# Patient Record
Sex: MALE | Race: WHITE | Employment: FULL TIME | URBAN - METROPOLITAN AREA
[De-identification: names, ages, dates, MRNs, and addresses within clinical notes are randomized per-mention and may not be internally consistent; named-entity substitution may affect disease eponyms.]

---

## 2017-03-05 ENCOUNTER — APPOINTMENT (OUTPATIENT)
Dept: GENERAL RADIOLOGY | Age: 27
End: 2017-03-05
Attending: EMERGENCY MEDICINE
Payer: SELF-PAY

## 2017-03-05 ENCOUNTER — HOSPITAL ENCOUNTER (EMERGENCY)
Age: 27
Discharge: HOME | End: 2017-03-05
Attending: EMERGENCY MEDICINE
Payer: SELF-PAY

## 2017-03-05 VITALS
SYSTOLIC BLOOD PRESSURE: 140 MMHG | BODY MASS INDEX: 21 KG/M2 | RESPIRATION RATE: 15 BRPM | TEMPERATURE: 98.3 F | HEIGHT: 71 IN | DIASTOLIC BLOOD PRESSURE: 61 MMHG | OXYGEN SATURATION: 100 % | HEART RATE: 103 BPM | WEIGHT: 150 LBS

## 2017-03-05 DIAGNOSIS — T40.601A NARCOTIC OVERDOSE, ACCIDENTAL OR UNINTENTIONAL, INITIAL ENCOUNTER (HCC): Primary | ICD-10-CM

## 2017-03-05 LAB
ALBUMIN SERPL BCP-MCNC: 3.9 G/DL (ref 3.5–5)
ALBUMIN/GLOB SERPL: 1.1 {RATIO} (ref 1.1–2.2)
ALP SERPL-CCNC: 58 U/L (ref 45–117)
ALT SERPL-CCNC: 29 U/L (ref 12–78)
ANION GAP BLD CALC-SCNC: 9 MMOL/L (ref 5–15)
APAP SERPL-MCNC: <2 UG/ML (ref 10–30)
AST SERPL W P-5'-P-CCNC: 17 U/L (ref 15–37)
BASOPHILS # BLD AUTO: 0 K/UL (ref 0–0.1)
BASOPHILS # BLD: 0 % (ref 0–1)
BILIRUB SERPL-MCNC: 0.4 MG/DL (ref 0.2–1)
BUN SERPL-MCNC: 8 MG/DL (ref 6–20)
BUN/CREAT SERPL: 7 (ref 12–20)
CALCIUM SERPL-MCNC: 8.4 MG/DL (ref 8.5–10.1)
CHLORIDE SERPL-SCNC: 102 MMOL/L (ref 97–108)
CO2 SERPL-SCNC: 25 MMOL/L (ref 21–32)
CREAT SERPL-MCNC: 1.09 MG/DL (ref 0.7–1.3)
EOSINOPHIL # BLD: 0.2 K/UL (ref 0–0.4)
EOSINOPHIL NFR BLD: 5 % (ref 0–7)
ERYTHROCYTE [DISTWIDTH] IN BLOOD BY AUTOMATED COUNT: 12.2 % (ref 11.5–14.5)
ETHANOL SERPL-MCNC: <10 MG/DL
GLOBULIN SER CALC-MCNC: 3.5 G/DL (ref 2–4)
GLUCOSE SERPL-MCNC: 201 MG/DL (ref 65–100)
HCT VFR BLD AUTO: 39 % (ref 36.6–50.3)
HGB BLD-MCNC: 13.2 G/DL (ref 12.1–17)
LYMPHOCYTES # BLD AUTO: 35 % (ref 12–49)
LYMPHOCYTES # BLD: 1.4 K/UL (ref 0.8–3.5)
MCH RBC QN AUTO: 30 PG (ref 26–34)
MCHC RBC AUTO-ENTMCNC: 33.8 G/DL (ref 30–36.5)
MCV RBC AUTO: 88.6 FL (ref 80–99)
MONOCYTES # BLD: 0.3 K/UL (ref 0–1)
MONOCYTES NFR BLD AUTO: 9 % (ref 5–13)
NEUTS SEG # BLD: 2 K/UL (ref 1.8–8)
NEUTS SEG NFR BLD AUTO: 51 % (ref 32–75)
PLATELET # BLD AUTO: 129 K/UL (ref 150–400)
POTASSIUM SERPL-SCNC: 3.8 MMOL/L (ref 3.5–5.1)
PROT SERPL-MCNC: 7.4 G/DL (ref 6.4–8.2)
RBC # BLD AUTO: 4.4 M/UL (ref 4.1–5.7)
SALICYLATES SERPL-MCNC: <1.7 MG/DL (ref 2.8–20)
SODIUM SERPL-SCNC: 136 MMOL/L (ref 136–145)
WBC # BLD AUTO: 3.9 K/UL (ref 4.1–11.1)

## 2017-03-05 PROCEDURE — 99285 EMERGENCY DEPT VISIT HI MDM: CPT

## 2017-03-05 PROCEDURE — 36415 COLL VENOUS BLD VENIPUNCTURE: CPT | Performed by: EMERGENCY MEDICINE

## 2017-03-05 PROCEDURE — 71010 XR CHEST PORT: CPT

## 2017-03-05 PROCEDURE — 80307 DRUG TEST PRSMV CHEM ANLYZR: CPT | Performed by: EMERGENCY MEDICINE

## 2017-03-05 PROCEDURE — 85025 COMPLETE CBC W/AUTO DIFF WBC: CPT | Performed by: EMERGENCY MEDICINE

## 2017-03-05 PROCEDURE — 80053 COMPREHEN METABOLIC PANEL: CPT | Performed by: EMERGENCY MEDICINE

## 2017-03-05 PROCEDURE — 93005 ELECTROCARDIOGRAM TRACING: CPT

## 2017-03-05 NOTE — DISCHARGE INSTRUCTIONS
Alcohol, Drug, or Poison Ingestion: Care Instructions  Your Care Instructions  A person can become very sick, or die, from swallowing or using alcohol, drugs, or poisons. Alcohol poisoning occurs when a person drinks a large amount of alcohol. Alcohol can stop nerve signals that control breathing. It can also stop the gag reflex that prevents choking. Alcohol poisoning is serious. It can lead to brain damage or death if it's not treated right away. Drugs can be used by accident or on purpose. They can be swallowed, inhaled, injected, or absorbed through the skin. Drugs include over-the-counter medicine (such as aspirin or acetaminophen) and prescription medicine. They also include vitamins and supplements. And they include illegal drugs such as cocaine and heroin. And poisons are all around us. They include household , cosmetics, houseplants, and garden chemicals. The doctor has checked you carefully, but problems can develop later. If you notice any problems or new symptoms, get medical treatment right away. Follow-up care is a key part of your treatment and safety. Be sure to make and go to all appointments, and call your doctor if you are having problems. It's also a good idea to know your test results and keep a list of the medicines you take. How can you care for yourself at home? Alcohol problems  · Talk to your doctor or counselor about programs that can help you stop using alcohol. · Plan ways to avoid being tempted to drink. ¨ Get rid of all alcohol in your home. ¨ Avoid places where you tend to drink. ¨ Stay away from places or events that offer alcohol. ¨ Stay away from people who drink a lot. Drug problems  · Talk to your doctor about programs that can help you stop using drugs. · Get rid of any drugs you might be tempted to misuse. · Learn how to say no when other people use drugs. · Don't spend time with people who use drugs.   Poison prevention  · Keep products in the containers they came in. Keep them with the original labels. · Be careful when you use cleaning products, paints, solvents, and pesticides. Read labels before use. Use a fan to move strong odors and fumes out of your home. · Do not mix cleaning products. Try to use nontoxic . These include vinegar, lemon juice, and baking soda. When should you call for help? Poison control centers, hospitals, or your doctor can give immediate advice in the case of a poisoning. The [facility] [employer] number is [phone]. Have the poison container with you so you can give complete information to the poison control center, such as what the poison or substance is, how much was taken and when. Do not try to make the person vomit. Call 911 anytime you think you may need emergency care. For example, call if you or someone else:  · Has used or currently uses alcohol or drugs and is very confused or can't stay awake. · Has passed out (lost consciousness). · Has severe trouble breathing. · Is having a seizure. Call your doctor now or seek immediate medical care if you or someone else:  · Has new symptoms, or is not acting normally. Watch closely for changes in your health, and be sure to contact your doctor if:  · You do not get better as expected. · You need help with drug or alcohol problems. · You have problems with depression or other mental health issues. Where can you learn more? Go to http://jatinder-fide.info/. Enter Z059 in the search box to learn more about \"Alcohol, Drug, or Poison Ingestion: Care Instructions. \"  Current as of: May 27, 2016  Content Version: 11.1  © 9976-2749 [employer]. Care instructions adapted under license by [employer] (which disclaims liability or warranty for this information).  If you have questions about a medical condition or this instruction, always ask your healthcare professional. [employer] disclaims any warranty or liability for your use of this information. We hope that we have addressed all of your medical concerns. The examination and treatment you received in the Emergency Department were for an emergent problem and were not intended as complete care. It is important that you follow up with your healthcare provider(s) for ongoing care. If your symptoms worsen or do not improve as expected, and you are unable to reach your usual health care provider(s), you should return to the Emergency Department. Today's healthcare is undergoing tremendous change, and patient satisfaction surveys are one of the many tools to assess the quality of medical care. You may receive a survey from the [employer] organization regarding your experience in the Emergency Department. I hope that your experience has been completely positive, particularly the medical care that I provided. As such, please participate in the survey; anything less than excellent does not meet my expectations or intentions. Formerly [facility] and [address] [place] [address] participate in nationally recognized quality of care measures. If your blood pressure is greater than 120/80, as reported below, we urge that you seek medical care to address the potential of high blood pressure, commonly known as hypertension. Hypertension can be hereditary or can be caused by certain medical conditions, pain, stress, or \"white coat syndrome. \"       Please make an appointment with your health care provider(s) for follow up of your Emergency Department visit. VITALS:   Patient Vitals for the past 8 hrs:   Temp Pulse Resp BP SpO2   03/05/17 1415 - 95 20 155/83 100 %   03/05/17 1400 - 98 15 (!) 149/98 100 %   03/05/17 1345 - 96 20 143/88 100 %   03/05/17 1336 98.3 °F (36.8 °C) 85 18 (!) 161/103 100 %          Thank you for allowing us to provide you with medical care today.   We realize that you have many choices for your emergency care needs. Please choose us in the future for any continued health care needs. Carla Devaughn Nellie Finch, [address] [place], [place] [address]y [address].   Office: [phone]            Recent Results (from the past 24 hour(s))   EKG, 12 LEAD, INITIAL    Collection Time: 03/05/17  1:35 PM   Result Value Ref Range    Ventricular Rate 88 BPM    Atrial Rate 88 BPM    P-R Interval 136 ms    QRS Duration 94 ms    Q-T Interval 374 ms    QTC Calculation (Bezet) 452 ms    Calculated P Axis 80 degrees    Calculated R Axis 92 degrees    Calculated T Axis 33 degrees    Diagnosis       Normal sinus rhythm with sinus arrhythmia  When compared with ECG of 20-NOV-2014 18:26,  Vent. rate has decreased BY  57 BPM     METABOLIC PANEL, COMPREHENSIVE    Collection Time: 03/05/17  1:41 PM   Result Value Ref Range    Sodium 136 136 - 145 mmol/L    Potassium 3.8 3.5 - 5.1 mmol/L    Chloride 102 97 - 108 mmol/L    CO2 25 21 - 32 mmol/L    Anion gap 9 5 - 15 mmol/L    Glucose 201 (H) 65 - 100 mg/dL    BUN 8 6 - 20 MG/DL    Creatinine 1.09 0.70 - 1.30 MG/DL    BUN/Creatinine ratio 7 (L) 12 - 20      GFR est AA >60 >60 ml/min/1.73m2    GFR est non-AA >60 >60 ml/min/1.73m2    Calcium 8.4 (L) 8.5 - 10.1 MG/DL    Bilirubin, total 0.4 0.2 - 1.0 MG/DL    ALT (SGPT) 29 12 - 78 U/L    AST (SGOT) 17 15 - 37 U/L    Alk. phosphatase 58 45 - 117 U/L    Protein, total 7.4 6.4 - 8.2 g/dL    Albumin 3.9 3.5 - 5.0 g/dL    Globulin 3.5 2.0 - 4.0 g/dL    A-G Ratio 1.1 1.1 - 2.2     CBC WITH AUTOMATED DIFF    Collection Time: 03/05/17  1:41 PM   Result Value Ref Range    WBC 3.9 (L) 4.1 - 11.1 K/uL    RBC 4.40 4. 10 - 5.70 M/uL    HGB 13.2 12.1 - 17.0 g/dL    HCT 39.0 36.6 - 50.3 %    MCV 88.6 80.0 - 99.0 FL    MCH 30.0 26.0 - 34.0 PG    MCHC 33.8 30.0 - 36.5 g/dL    RDW 12.2 11.5 - 14.5 %    PLATELET 685 (L) 063 - 400 K/uL    NEUTROPHILS 51 32 - 75 %    LYMPHOCYTES 35 12 - 49 %    MONOCYTES 9 5 - 13 %    EOSINOPHILS 5 0 - 7 %    BASOPHILS 0 0 - 1 %    ABS. NEUTROPHILS 2.0 1.8 - 8.0 K/UL    ABS. LYMPHOCYTES 1.4 0.8 - 3.5 K/UL    ABS. MONOCYTES 0.3 0.0 - 1.0 K/UL    ABS. EOSINOPHILS 0.2 0.0 - 0.4 K/UL    ABS. BASOPHILS 0.0 0.0 - 0.1 K/UL   ACETAMINOPHEN    Collection Time: 03/05/17  1:41 PM   Result Value Ref Range    ACETAMINOPHEN <2 (L) 10 - 30 ug/mL   ETHYL ALCOHOL    Collection Time: 03/05/17  1:41 PM   Result Value Ref Range    ALCOHOL(ETHYL),SERUM <51 <76 MG/DL   SALICYLATE    Collection Time: 03/05/17  1:41 PM   Result Value Ref Range    SALICYLATE <1.1 (L) 2.8 - 20.0 MG/DL       Xr Chest Port    Result Date: 3/5/2017  Clinical history: Overdose INDICATION: Overdose COMPARISON: 2014 FINDINGS: AP semiupright view of the chest is obtained . The cardiopericardial silhouette is stable. There is no pleural effusion, pneumothorax or focal consolidation present. IMPRESSION: No acute thoracic disease.

## 2017-03-05 NOTE — ED TRIAGE NOTES
Triage:  Pt to ED via EMS due to overdose/unresponsiveness. Pt states this afternoon he used heroin of unknown amount, EMS called due to Pt being found unresponsive with little to no respiratory effort. EMS gave 1.5 mg of Narcan and Pt had near immediate improvement in status, orientation, and respiratory effort. EMS states stable transport enroute, no other events noted. On arrival, Pt A/O x4, with continued verbal requests to talk with his sister. Pt states he feels fine.

## 2017-03-05 NOTE — ED PROVIDER NOTES
HPI Comments: 23 y.o. male with past medical history significant for heroin overdose, bipolar, depression and anxiety who presents from home via EMS for evaluation of overdose. Per EMS, pt was found unresponsive, not breathing but still had a pulse. Pt was given 1.5 mg of Narcan IV and had an immediate improvement in his condition. Pt admits to shooting 1 point of heroin today but says that \"this is the first time he has used in a while\". Pt has no complaints at this time. Pt denies other health problems. Pt denies weight loss or changes in appetite. Pt is a poor historian. There are no other acute medical concerns at this time. Social hx: (+) Heroin use, denies EtOH. Claims to have new career. Note written by Mini Perez, as dictated by Mirian Perdomo MD 1:37 PM      The history is provided by the patient and the EMS personnel. No  was used. Past Medical History:   Diagnosis Date    Acid reflux     Allergic rhinitis     Anxiety     Bipolar     Depression     Drug overdose     Heroin    Psychotic disorder        Past Surgical History:   Procedure Laterality Date    HX ORTHOPAEDIC      Right elbow    HX ORTHOPAEDIC      L hand         Family History:   Problem Relation Age of Onset    Heart Disease Mother     Diabetes Maternal Uncle        Social History     Social History    Marital status: SINGLE     Spouse name: N/A    Number of children: N/A    Years of education: N/A     Occupational History    Not on file.      Social History Main Topics    Smoking status: Current Every Day Smoker     Packs/day: 1.00    Smokeless tobacco: Former User    Alcohol use No    Drug use: Yes     Special: Marijuana, Heroin    Sexual activity: Yes     Partners: Female     Birth control/ protection: None     Other Topics Concern    Not on file     Social History Narrative         ALLERGIES: Sulfa (sulfonamide antibiotics)    Review of Systems   Constitutional: Negative for appetite change, chills, fever and unexpected weight change. HENT: Negative for congestion. Eyes: Negative for visual disturbance. Respiratory: Negative for cough, chest tightness, shortness of breath and wheezing. Cardiovascular: Negative for chest pain. Gastrointestinal: Negative for abdominal pain, diarrhea and vomiting. Genitourinary: Negative for dysuria and frequency. Musculoskeletal: Negative for joint swelling. Skin: Negative for rash. Neurological: Negative for speech difficulty and headaches. All other systems reviewed and are negative. Vitals:    03/05/17 1336   BP: (!) 161/103   Pulse: 85   Resp: 18   Temp: 98.3 °F (36.8 °C)   SpO2: 100%   Weight: 68 kg (150 lb)   Height: 5' 11\" (1.803 m)            Physical Exam   Constitutional: He is oriented to person, place, and time. He appears well-developed and well-nourished. No distress. HENT:   Head: Normocephalic and atraumatic. Nose: Nose normal.   Eyes: Conjunctivae are normal. Pupils are equal, round, and reactive to light. No scleral icterus. Neck: Normal range of motion. Neck supple. No JVD present. No tracheal deviation present. No thyromegaly present. Cardiovascular: Normal rate, regular rhythm and normal heart sounds. No murmur heard. Pulmonary/Chest: Effort normal and breath sounds normal. No respiratory distress. He has no wheezes. He has no rales. Abdominal: Soft. Bowel sounds are normal. He exhibits no mass. There is no tenderness. There is no rebound and no guarding. Musculoskeletal: Normal range of motion. He exhibits no edema. Neurological: He is alert and oriented to person, place, and time. No cranial nerve deficit. Coordination normal.   Skin: Skin is warm and dry. No rash noted. He is not diaphoretic. No erythema. Psychiatric: He has a normal mood and affect. His behavior is normal.   Nursing note and vitals reviewed. Note written by Rock Caveshaista So.  Jaylen Riley, as dictated by Basil Anderson Payton Dandy, MD 1:37 PM      [facility]  ED Course       Procedures

## 2017-03-06 LAB
ATRIAL RATE: 88 BPM
CALCULATED P AXIS, ECG09: 80 DEGREES
CALCULATED R AXIS, ECG10: 92 DEGREES
CALCULATED T AXIS, ECG11: 33 DEGREES
DIAGNOSIS, 93000: NORMAL
P-R INTERVAL, ECG05: 136 MS
Q-T INTERVAL, ECG07: 374 MS
QRS DURATION, ECG06: 94 MS
QTC CALCULATION (BEZET), ECG08: 452 MS
VENTRICULAR RATE, ECG03: 88 BPM

## 2017-05-11 ENCOUNTER — HOSPITAL ENCOUNTER (EMERGENCY)
Age: 27
Discharge: HOME | End: 2017-05-11
Attending: EMERGENCY MEDICINE
Payer: SELF-PAY

## 2017-05-11 VITALS
RESPIRATION RATE: 16 BRPM | DIASTOLIC BLOOD PRESSURE: 65 MMHG | SYSTOLIC BLOOD PRESSURE: 121 MMHG | OXYGEN SATURATION: 100 % | BODY MASS INDEX: 20.3 KG/M2 | HEART RATE: 74 BPM | HEIGHT: 71 IN | TEMPERATURE: 98.1 F | WEIGHT: 145 LBS

## 2017-05-11 DIAGNOSIS — L03.114 CELLULITIS OF FOREARM, LEFT: Primary | ICD-10-CM

## 2017-05-11 PROCEDURE — 99282 EMERGENCY DEPT VISIT SF MDM: CPT

## 2017-05-11 RX ORDER — CLINDAMYCIN HYDROCHLORIDE 150 MG/1
300 CAPSULE ORAL 3 TIMES DAILY
Qty: 42 CAP | Refills: 0 | Status: SHIPPED | OUTPATIENT
Start: 2017-05-11 | End: 2017-05-18

## 2017-05-12 NOTE — ED PROVIDER NOTES
HPI Comments: Kelly Oneill III is a 23 y.o. male who presents home to ER with c/o left arm pain. Pt reports 4-5 day hx of left forearm pain, erythema and swelling. Pt states symptoms have worsened since onset. He describes site as initally similar to an insect bite of no known origin. Pt is concerned for possible infection. No exacerbating or alleviating factors noted. Pt denies erythema or swelling past elbow of LUE. He further denies any fevers, chills, nausea, vomiting, chest pain, shortness of breath, headache, rash, diarrhea, abdominal pain, urinary/bowel changes, sweating or weight loss. There are no other acute medical concerns at this time. PCP: None   PMHx significant for: Past Medical History:  No date: Acid reflux  No date: Allergic rhinitis  No date: Anxiety  No date: Bipolar  No date: Depression  No date: Drug overdose      Comment: Heroin  No date: Psychotic disorder    PSHx significant for: Past Surgical History:  No date: HX ORTHOPAEDIC      Comment: Right elbow  No date: HX ORTHOPAEDIC      Comment: L hand     Allergies:    -- Sulfa (Sulfonamide Antibiotics) -- Unknown (comments)    --  States has always been told allergic but does not             recall any specific reaction    Note written by Jaylen Feliciano, as dictated by Mary Florentino PA-C 2:19 AM    The history is provided by the patient. No  was used.         Past Medical History:   Diagnosis Date    Acid reflux     Allergic rhinitis     Anxiety     Bipolar     Depression     Drug overdose     Heroin    Psychotic disorder        Past Surgical History:   Procedure Laterality Date    HX ORTHOPAEDIC      Right elbow    HX ORTHOPAEDIC      L hand         Family History:   Problem Relation Age of Onset    Heart Disease Mother     Diabetes Maternal Uncle        Social History     Social History    Marital status: SINGLE     Spouse name: N/A    Number of children: N/A    Years of education: N/A Occupational History    Not on file. Social History Main Topics    Smoking status: Current Every Day Smoker     Packs/day: 1.00    Smokeless tobacco: Former User    Alcohol use No    Drug use: Yes     Special: Marijuana, Heroin    Sexual activity: Yes     Partners: Female     Birth control/ protection: None     Other Topics Concern    Not on file     Social History Narrative         ALLERGIES: Sulfa (sulfonamide antibiotics)    Review of Systems   Constitutional: Negative. Negative for chills and fever. HENT: Negative. Eyes: Negative. Respiratory: Negative. Cardiovascular: Negative. Gastrointestinal: Negative. Genitourinary: Negative. Musculoskeletal: Negative. Skin: Positive for color change (redness left arm). Neurological: Negative. Hematological: Negative. Psychiatric/Behavioral: Negative. All other systems reviewed and are negative. Vitals:    05/11/17 2049   BP: 121/65   Pulse: 74   Resp: 16   Temp: 98.1 °F (36.7 °C)   SpO2: 100%   Weight: 65.8 kg (145 lb)   Height: 5' 11\" (1.803 m)            Physical Exam   Constitutional: He is oriented to person, place, and time. He appears well-developed and well-nourished. No distress. HENT:   Head: Normocephalic and atraumatic. Neck: Normal range of motion. Cardiovascular: Intact distal pulses and normal pulses. Musculoskeletal: Normal range of motion. He exhibits no edema or tenderness. Neurological: He is alert and oriented to person, place, and time. He has normal strength. No sensory deficit. Skin: Skin is warm and dry. No rash noted. He is not diaphoretic. No erythema. No pallor. Psychiatric: He has a normal mood and affect. His behavior is normal.   Nursing note and vitals reviewed.        MDM  Number of Diagnoses or Management Options  Cellulitis of forearm, left:   Diagnosis management comments: DDx: cellulitis, abscess, insect bite       Amount and/or Complexity of Data Reviewed  Discuss the patient with other providers: yes    Patient Progress  Patient progress: stable    ED Course       Procedures                       9:45 PM   Asa Morton PA-C discussed patient with Arnol Warner MD who is in agreement with care plan as outlined. No further recommendations. Asa Morton PA-C          9:45 PM  Pt has been reevaluated. There are no new complaints, changes, or physical findings at this time. Medications have been reviewed w/ pt and/or family. Pt and/or family's questions have been answered. Pt and/or family expressed good understanding of the dx/tx/rx and is in agreement with plan of care. Pt instructed and agreed to f/u w/ PCP and to return to ED upon further deterioration. Pt is ready for discharge. LABORATORY TESTS:  No results found for this or any previous visit (from the past 12 hour(s)). IMAGING RESULTS:  No orders to display     No results found. MEDICATIONS GIVEN:  Medications - No data to display    IMPRESSION:  1. Cellulitis of forearm, left        PLAN:  1. Current Discharge Medication List      START taking these medications    Details   clindamycin (CLEOCIN) 150 mg capsule Take 2 Caps by mouth three (3) times daily for 7 days. Qty: 42 Cap, Refills: 0           2.    Follow-up Information     Follow up With Details Comments [address] [facility] Schedule an appointment as soon as possible for a visit in 3 days  [address] [address] [address]    [facility] EMERGENCY DEPT  If symptoms worsen [address] [facility]  [phone]            Return to ED if worse

## 2017-05-12 NOTE — DISCHARGE INSTRUCTIONS
We hope that we have addressed all of your medical concerns. The examination and treatment you received in the Emergency Department were for an emergent problem and were not intended as complete care. It is important that you follow up with your healthcare provider(s) for ongoing care. If your symptoms worsen or do not improve as expected, and you are unable to reach your usual health care provider(s), you should return to the Emergency Department. Today's healthcare is undergoing tremendous change, and patient satisfaction surveys are one of the many tools to assess the quality of medical care. You may receive a survey from the [employer] regarding your experience in the Emergency Department. I hope that your experience has been completely positive, particularly the medical care that I provided. As such, please participate in the survey; anything less than excellent does not meet my expectations or intentions. [facility] and [address] [place] [address] participate in nationally recognized quality of care measures. If your blood pressure is greater than 120/80, as reported below, we urge that you seek medical care to address the potential of high blood pressure, commonly known as hypertension. Hypertension can be hereditary or can be caused by certain medical conditions, pain, stress, or \"white coat syndrome. \"       Please make an appointment with your health care provider(s) for follow up of your Emergency Department visit. VITALS:   Patient Vitals for the past 8 hrs:   Temp Pulse Resp BP SpO2   05/11/17 2049 98.1 °F (36.7 °C) 74 16 121/65 100 %          Thank you for allowing us to provide you with medical care today. We realize that you have many choices for your emergency care needs. Please choose us in the future for any continued health care needs. Gurinder Grijalva, [address] [place] [address] [address] Office: [phone]            No results found for this or any previous visit (from the past 24 hour(s)). No results found. Cellulitis: Care Instructions  Your Care Instructions    Cellulitis is a skin infection. It often occurs after a break in the skin from a scrape, cut, bite, or puncture, or after a rash. The doctor has checked you carefully, but problems can develop later. If you notice any problems or new symptoms, get medical treatment right away. Follow-up care is a key part of your treatment and safety. Be sure to make and go to all appointments, and call your doctor if you are having problems. It's also a good idea to know your test results and keep a list of the medicines you take. How can you care for yourself at home? · Take your antibiotics as directed. Do not stop taking them just because you feel better. You need to take the full course of antibiotics. · Prop up the infected area on pillows to reduce pain and swelling. Try to keep the area above the level of your heart as often as you can. · If your doctor told you how to care for your wound, follow your doctor's instructions. If you did not get instructions, follow this general advice:  ¨ Wash the wound with clean water 2 times a day. Don't use hydrogen peroxide or alcohol, which can slow healing. ¨ You may cover the wound with a thin layer of petroleum jelly, such as Vaseline, and a nonstick bandage. ¨ Apply more petroleum jelly and replace the bandage as needed. · Be safe with medicines. Take pain medicines exactly as directed. ¨ If the doctor gave you a prescription medicine for pain, take it as prescribed. ¨ If you are not taking a prescription pain medicine, ask your doctor if you can take an over-the-counter medicine. To prevent cellulitis in the future  · Try to prevent cuts, scrapes, or other injuries to your skin. Cellulitis most often occurs where there is a break in the skin.   · If you get a scrape, cut, mild burn, or bite, wash the wound with clean water as soon as you can to help avoid infection. Don't use hydrogen peroxide or alcohol, which can slow healing. · If you have swelling in your legs (edema), support stockings and good skin care may help prevent leg sores and cellulitis. · Take care of your feet, especially if you have diabetes or other conditions that increase the risk of infection. Wear shoes and socks. Do not go barefoot. If you have athlete's foot or other skin problems on your feet, talk to your doctor about how to treat them. When should you call for help? Call your doctor now or seek immediate medical care if:  · You have signs that your infection is getting worse, such as:  ¨ Increased pain, swelling, warmth, or redness. ¨ Red streaks leading from the area. ¨ Pus draining from the area. ¨ A fever. · You get a rash. Watch closely for changes in your health, and be sure to contact your doctor if:  · You are not getting better after 1 day (24 hours). · You do not get better as expected. Where can you learn more? Go to http://jatinder-fide.info/. Angeles Kaminski in the search box to learn more about \"Cellulitis: Care Instructions. \"  Current as of: October 13, 2016  Content Version: 11.2  © 5239-3688 [employer]. Care instructions adapted under license by [employer] (which disclaims liability or warranty for this information). If you have questions about a medical condition or this instruction, always ask your healthcare professional. [place] [address] any warranty or liability for your use of this information.

## 2017-05-12 NOTE — ED TRIAGE NOTES
Pt arrives with c/o of arm swelling for the  4-5 days, arm is swollen hot to touch, with a palpable knot. Thinks it is an infection.

## 2017-10-10 ENCOUNTER — HOSPITAL ENCOUNTER (EMERGENCY)
Age: 27
Discharge: HOME | End: 2017-10-11
Attending: EMERGENCY MEDICINE
Payer: SELF-PAY

## 2017-10-10 ENCOUNTER — APPOINTMENT (OUTPATIENT)
Dept: CT IMAGING | Age: 27
End: 2017-10-10
Attending: EMERGENCY MEDICINE
Payer: SELF-PAY

## 2017-10-10 ENCOUNTER — APPOINTMENT (OUTPATIENT)
Dept: ULTRASOUND IMAGING | Age: 27
End: 2017-10-10
Attending: EMERGENCY MEDICINE
Payer: SELF-PAY

## 2017-10-10 DIAGNOSIS — N20.0 KIDNEY STONE: Primary | ICD-10-CM

## 2017-10-10 LAB
ALBUMIN SERPL-MCNC: 3.9 G/DL (ref 3.5–5)
ALBUMIN/GLOB SERPL: 1 {RATIO} (ref 1.1–2.2)
ALP SERPL-CCNC: 76 U/L (ref 45–117)
ALT SERPL-CCNC: 365 U/L (ref 12–78)
ANION GAP SERPL CALC-SCNC: 7 MMOL/L (ref 5–15)
APPEARANCE UR: ABNORMAL
AST SERPL-CCNC: 148 U/L (ref 15–37)
BACTERIA URNS QL MICRO: ABNORMAL /HPF
BASOPHILS # BLD: 0 K/UL (ref 0–0.1)
BASOPHILS NFR BLD: 0 % (ref 0–1)
BILIRUB SERPL-MCNC: 0.3 MG/DL (ref 0.2–1)
BILIRUB UR QL: NEGATIVE
BUN SERPL-MCNC: 12 MG/DL (ref 6–20)
BUN/CREAT SERPL: 10 (ref 12–20)
CALCIUM SERPL-MCNC: 9.4 MG/DL (ref 8.5–10.1)
CHLORIDE SERPL-SCNC: 104 MMOL/L (ref 97–108)
CO2 SERPL-SCNC: 30 MMOL/L (ref 21–32)
COLOR UR: ABNORMAL
CREAT SERPL-MCNC: 1.22 MG/DL (ref 0.7–1.3)
DIFFERENTIAL METHOD BLD: ABNORMAL
EOSINOPHIL # BLD: 0.6 K/UL (ref 0–0.4)
EOSINOPHIL NFR BLD: 7 % (ref 0–7)
EPITH CASTS URNS QL MICRO: ABNORMAL /LPF
ERYTHROCYTE [DISTWIDTH] IN BLOOD BY AUTOMATED COUNT: 12.1 % (ref 11.5–14.5)
GLOBULIN SER CALC-MCNC: 4 G/DL (ref 2–4)
GLUCOSE SERPL-MCNC: 102 MG/DL (ref 65–100)
GLUCOSE UR STRIP.AUTO-MCNC: NEGATIVE MG/DL
HCT VFR BLD AUTO: 41.4 % (ref 36.6–50.3)
HGB BLD-MCNC: 14.5 G/DL (ref 12.1–17)
HGB UR QL STRIP: ABNORMAL
KETONES UR QL STRIP.AUTO: NEGATIVE MG/DL
LEUKOCYTE ESTERASE UR QL STRIP.AUTO: NEGATIVE
LYMPHOCYTES # BLD: 2.4 K/UL
LYMPHOCYTES NFR BLD: 31 % (ref 12–49)
MCH RBC QN AUTO: 31.4 PG (ref 26–34)
MCHC RBC AUTO-ENTMCNC: 35 G/DL (ref 30–36.5)
MCV RBC AUTO: 89.6 FL (ref 80–99)
MONOCYTES # BLD: 0.5 K/UL (ref 0–1)
MONOCYTES NFR BLD: 7 % (ref 5–13)
NEUTS SEG # BLD: 4.2 K/UL (ref 1.8–8)
NEUTS SEG NFR BLD: 55 % (ref 32–75)
NITRITE UR QL STRIP.AUTO: NEGATIVE
PH UR STRIP: 7 [PH] (ref 5–8)
PLATELET # BLD AUTO: 174 K/UL (ref 150–400)
POTASSIUM SERPL-SCNC: 3.9 MMOL/L (ref 3.5–5.1)
PROT SERPL-MCNC: 7.9 G/DL (ref 6.4–8.2)
PROT UR STRIP-MCNC: ABNORMAL MG/DL
RBC # BLD AUTO: 4.62 M/UL (ref 4.1–5.7)
RBC #/AREA URNS HPF: >100 /HPF (ref 0–5)
SODIUM SERPL-SCNC: 141 MMOL/L (ref 136–145)
SP GR UR REFRACTOMETRY: 1.02 (ref 1–1.03)
UA: UC IF INDICATED,UAUC: ABNORMAL
UROBILINOGEN UR QL STRIP.AUTO: 1 EU/DL (ref 0.2–1)
WBC # BLD AUTO: 7.7 K/UL (ref 4.1–11.1)
WBC URNS QL MICRO: ABNORMAL /HPF (ref 0–4)

## 2017-10-10 PROCEDURE — 85025 COMPLETE CBC W/AUTO DIFF WBC: CPT | Performed by: EMERGENCY MEDICINE

## 2017-10-10 PROCEDURE — 99284 EMERGENCY DEPT VISIT MOD MDM: CPT

## 2017-10-10 PROCEDURE — 96361 HYDRATE IV INFUSION ADD-ON: CPT

## 2017-10-10 PROCEDURE — 96374 THER/PROPH/DIAG INJ IV PUSH: CPT

## 2017-10-10 PROCEDURE — 76870 US EXAM SCROTUM: CPT

## 2017-10-10 PROCEDURE — 87086 URINE CULTURE/COLONY COUNT: CPT | Performed by: EMERGENCY MEDICINE

## 2017-10-10 PROCEDURE — 74176 CT ABD & PELVIS W/O CONTRAST: CPT

## 2017-10-10 PROCEDURE — 36415 COLL VENOUS BLD VENIPUNCTURE: CPT | Performed by: EMERGENCY MEDICINE

## 2017-10-10 PROCEDURE — 80053 COMPREHEN METABOLIC PANEL: CPT | Performed by: EMERGENCY MEDICINE

## 2017-10-10 PROCEDURE — 81001 URINALYSIS AUTO W/SCOPE: CPT | Performed by: EMERGENCY MEDICINE

## 2017-10-10 PROCEDURE — 74011250636 HC RX REV CODE- 250/636: Performed by: EMERGENCY MEDICINE

## 2017-10-10 PROCEDURE — 96375 TX/PRO/DX INJ NEW DRUG ADDON: CPT

## 2017-10-10 RX ORDER — TAMSULOSIN HYDROCHLORIDE 0.4 MG/1
0.4 CAPSULE ORAL
Status: COMPLETED | OUTPATIENT
Start: 2017-10-10 | End: 2017-10-11

## 2017-10-10 RX ORDER — KETOROLAC TROMETHAMINE 30 MG/ML
30 INJECTION, SOLUTION INTRAMUSCULAR; INTRAVENOUS ONCE
Status: COMPLETED | OUTPATIENT
Start: 2017-10-10 | End: 2017-10-10

## 2017-10-10 RX ORDER — MORPHINE SULFATE 2 MG/ML
4 INJECTION, SOLUTION INTRAMUSCULAR; INTRAVENOUS ONCE
Status: COMPLETED | OUTPATIENT
Start: 2017-10-10 | End: 2017-10-11

## 2017-10-10 RX ADMIN — SODIUM CHLORIDE 1000 ML: 900 INJECTION, SOLUTION INTRAVENOUS at 22:06

## 2017-10-10 RX ADMIN — KETOROLAC TROMETHAMINE 30 MG: 30 INJECTION, SOLUTION INTRAMUSCULAR at 22:06

## 2017-10-11 VITALS
SYSTOLIC BLOOD PRESSURE: 123 MMHG | OXYGEN SATURATION: 99 % | RESPIRATION RATE: 16 BRPM | TEMPERATURE: 97.9 F | BODY MASS INDEX: 21.45 KG/M2 | HEIGHT: 71 IN | DIASTOLIC BLOOD PRESSURE: 59 MMHG | WEIGHT: 153.22 LBS | HEART RATE: 81 BPM

## 2017-10-11 PROCEDURE — 74011250636 HC RX REV CODE- 250/636: Performed by: EMERGENCY MEDICINE

## 2017-10-11 PROCEDURE — 74011250637 HC RX REV CODE- 250/637: Performed by: EMERGENCY MEDICINE

## 2017-10-11 RX ORDER — KETOROLAC TROMETHAMINE 10 MG/1
10 TABLET, FILM COATED ORAL
Qty: 10 TAB | Refills: 0 | Status: SHIPPED | OUTPATIENT
Start: 2017-10-11 | End: 2020-06-12

## 2017-10-11 RX ORDER — TAMSULOSIN HYDROCHLORIDE 0.4 MG/1
0.4 CAPSULE ORAL DAILY
Qty: 15 CAP | Refills: 0 | Status: SHIPPED | OUTPATIENT
Start: 2017-10-11 | End: 2017-10-26

## 2017-10-11 RX ADMIN — TAMSULOSIN HYDROCHLORIDE 0.4 MG: 0.4 CAPSULE ORAL at 00:08

## 2017-10-11 RX ADMIN — MORPHINE SULFATE 4 MG: 2 INJECTION, SOLUTION INTRAMUSCULAR; INTRAVENOUS at 00:09

## 2017-10-11 NOTE — DISCHARGE INSTRUCTIONS
Kidney Stone: Care Instructions  Your Care Instructions    Kidney stones are formed when salts, minerals, and other substances normally found in the urine clump together. They can be as small as grains of sand or, rarely, as large as golf balls. While the stone is traveling through the ureter, which is the tube that carries urine from the kidney to the bladder, you will probably feel pain. The pain may be mild or very severe. You may also have some blood in your urine. As soon as the stone reaches the bladder, any intense pain should go away. If a stone is too large to pass on its own, you may need a medical procedure to help you pass the stone. The doctor has checked you carefully, but problems can develop later. If you notice any problems or new symptoms, get medical treatment right away. Follow-up care is a key part of your treatment and safety. Be sure to make and go to all appointments, and call your doctor if you are having problems. It's also a good idea to know your test results and keep a list of the medicines you take. How can you care for yourself at home? · Drink plenty of fluids, enough so that your urine is light yellow or clear like water. If you have kidney, heart, or liver disease and have to limit fluids, talk with your doctor before you increase the amount of fluids you drink. · Take pain medicines exactly as directed. Call your doctor if you think you are having a problem with your medicine. ¨ If the doctor gave you a prescription medicine for pain, take it as prescribed. ¨ If you are not taking a prescription pain medicine, ask your doctor if you can take an over-the-counter medicine. Read and follow all instructions on the label. · Your doctor may ask you to strain your urine so that you can collect your kidney stone when it passes. You can use a kitchen strainer or a tea strainer to catch the stone. Store it in a plastic bag until you see your doctor again.   Preventing future kidney stones  Some changes in your diet may help prevent kidney stones. Depending on the cause of your stones, your doctor may recommend that you:  · Drink plenty of fluids, enough so that your urine is light yellow or clear like water. If you have kidney, heart, or liver disease and have to limit fluids, talk with your doctor before you increase the amount of fluids you drink. · Limit coffee, tea, and alcohol. Also avoid grapefruit juice. · Do not take more than the recommended daily dose of vitamins C and D.  · Avoid antacids such as Gaviscon, Maalox, Mylanta, or Tums. · Limit the amount of salt (sodium) in your diet. · Eat a balanced diet that is not too high in protein. · Limit foods that are high in a substance called oxalate, which can cause kidney stones. These foods include dark green vegetables, rhubarb, chocolate, wheat bran, nuts, cranberries, and beans. When should you call for help? Call your doctor now or seek immediate medical care if:  · You cannot keep down fluids. · Your pain gets worse. · You have a fever or chills. · You have new or worse pain in your back just below your rib cage (the flank area). · You have new or more blood in your urine. Watch closely for changes in your health, and be sure to contact your doctor if:  · You do not get better as expected. Where can you learn more? Go to http://jatinder-fide.info/. Enter Z982 in the search box to learn more about \"Kidney Stone: Care Instructions. \"  Current as of: April 3, 2017  Content Version: 11.3  © 8042-7707 [employer]. Care instructions adapted under license by [employer] (which disclaims liability or warranty for this information). If you have questions about a medical condition or this instruction, always ask your healthcare professional. [address] any warranty or liability for your use of this information.

## 2017-10-11 NOTE — ED NOTES
Discharge note: The patient was discharged home in stable condition, accompanied by family member. The patient is alert and oriented, is in no respiratory distress and has vital signs within normal limits. The patient's diagnosis, condition and treatment were explained to patient by Dr Wing Guerra and reinforced by nurse. The patient expressed understanding of discharge instructions, prescriptions, and plan of care. A discharge plan has been developed. A  was not involved in the process. Patient offered a wheelchair to ED lobby for discharge but declined at this time. Patient ambulatory to ED lobby to go home with family member.

## 2017-10-11 NOTE — ED NOTES
Bedside shift change report given to Cristopher holley RN (oncoming nurse) by NATA WANG (offgoing nurse). Report included the following information SBAR.

## 2017-10-11 NOTE — ED PROVIDER NOTES
HPI Comments: 25-year-old male with history of depression and narcotic abuse presents with complaints of 3 days intermittent left scrotal pain radiating into left lower quadrant abdomen. Increased in severity tonight. Reports gross hematuria. Denies trauma. Denies history of similar pain. Denies fever, chills, nausea, vomiting, chest pain, shortness of breath. Reports swelling to left testicle. Reports last intercourse approximately 3 months ago. Denies dysuria, drainage, penile lesion. No other complaints. Denies history of kidney stone. Patient denies drug use (review of records show history of heroin abuse with overdose in the past)  Former smoker  Denies alcohol use  No Primary care physician    The history is provided by the patient. Past Medical History:   Diagnosis Date    Acid reflux     Allergic rhinitis     Anxiety     Bipolar     Depression     Drug overdose     Heroin    Psychotic disorder        Past Surgical History:   Procedure Laterality Date    HX ORTHOPAEDIC      Right elbow    HX ORTHOPAEDIC      L hand         Family History:   Problem Relation Age of Onset    Heart Disease Mother     Diabetes Maternal Uncle        Social History     Social History    Marital status: SINGLE     Spouse name: N/A    Number of children: N/A    Years of education: N/A     Occupational History    Not on file. Social History Main Topics    Smoking status: Current Every Day Smoker     Packs/day: 1.00    Smokeless tobacco: Former User    Alcohol use No    Drug use: Yes     Special: Marijuana, Heroin    Sexual activity: Yes     Partners: Female     Birth control/ protection: None     Other Topics Concern    Not on file     Social History Narrative         ALLERGIES: Sulfa (sulfonamide antibiotics)    Review of Systems   Constitutional: Negative for chills and fever. HENT: Negative for congestion, nosebleeds and sore throat. Eyes: Negative for pain and discharge.    Respiratory: Negative for cough and shortness of breath. Cardiovascular: Negative for chest pain and palpitations. Gastrointestinal: Positive for abdominal pain. Negative for constipation, nausea and vomiting. Genitourinary: Positive for hematuria, testicular pain and urgency. Negative for decreased urine volume, dysuria and flank pain. Musculoskeletal: Negative for gait problem and myalgias. Skin: Negative for rash and wound. Neurological: Negative for seizures and syncope. Hematological: Does not bruise/bleed easily. Psychiatric/Behavioral: Negative for confusion, self-injury and suicidal ideas. Vitals:    10/10/17 2120   BP: (!) 126/94   Pulse: 100   Resp: 20   Temp: 97.9 °F (36.6 °C)   SpO2: 100%   Weight: 69.5 kg (153 lb 3.5 oz)   Height: 5' 11\" (1.803 m)            Physical Exam   Constitutional: He is oriented to person, place, and time. He appears well-developed and well-nourished. HENT:   Head: Normocephalic and atraumatic. Eyes: EOM are normal. Pupils are equal, round, and reactive to light. Neck: Normal range of motion. Neck supple. Cardiovascular: Normal rate, regular rhythm, normal heart sounds and intact distal pulses. Pulmonary/Chest: Effort normal and breath sounds normal. No respiratory distress. He has no wheezes. Abdominal: Soft. Bowel sounds are normal. There is no tenderness. There is no rebound and no guarding. Genitourinary: Penis normal. Right testis shows no swelling and no tenderness. Right testis is descended. Left testis shows tenderness. Left testis shows no swelling. Left testis is descended. Circumcised. Musculoskeletal: Normal range of motion. Neurological: He is alert and oriented to person, place, and time. Skin: Skin is warm and dry. Psychiatric: He has a normal mood and affect. His behavior is normal.   Nursing note and vitals reviewed.        MDM  Number of Diagnoses or Management Options  Diagnosis management comments: 28-year-old male with history of narcotic abuse presents with 3 days intermittent left lower quadrant and left testicular pain. Patient appears uncomfortable, unable to find comfortable position, afebrile, nontoxic, hemodynamic with stable, no penile or scrotal lesions noted, no scrotal swelling noted, increased pain with scrotal elevation. Plan-pain control, IV fluid hydration, scrotal ultrasound rule out torsion, CBC/CMP/UA, CT abdomen pelvis for possible kidney stone. Ultrasound shows no evidence of torsion  Labs unremarkable  CT shows left mid ureteral stone 6 mm x 3.6 mm with moderate hydronephrosis       Amount and/or Complexity of Data Reviewed  Clinical lab tests: ordered and reviewed  Tests in the radiology section of CPT®: ordered and reviewed  Discuss the patient with other providers: yes  Independent visualization of images, tracings, or specimens: yes    Risk of Complications, Morbidity, and/or Mortality  Presenting problems: moderate  Diagnostic procedures: moderate  Management options: moderate    Patient Progress  Patient progress: improved    ED Course       Procedures      2318  Unable to control patient's pain. 11:23 PM  Yunior Rivera MD spoke with Dr. Ailyn Pandey, Consult for Urology. Discussed available diagnostic tests and clinical findings. He/She is in agreement with care plans as outlined. He/she agreeable with plan for pain control and dc if able to control pain with fu in office tomorrow. 12:24 AM  Pain controlled per pt. Well appearing, nad, hd stable, nontoxic.    12:25 AM  Patient's results have been reviewed with them. Patient and/or family have verbally conveyed their understanding and agreement of the patient's signs, symptoms, diagnosis, treatment and prognosis and additionally agree to follow up as recommended or return to the Emergency Room should their condition change prior to follow-up.   Discharge instructions have also been provided to the patient with some educational information regarding their diagnosis as well a list of reasons why they would want to return to the ER prior to their follow-up appointment should their condition change.

## 2017-10-11 NOTE — ED NOTES
IVF's still running after radiology tests completed. Patient is right sidelying in the bed, for comfort.

## 2017-10-12 LAB
BACTERIA SPEC CULT: NORMAL
CC UR VC: NORMAL
SERVICE CMNT-IMP: NORMAL

## 2019-06-05 ENCOUNTER — ED HISTORICAL/CONVERTED ENCOUNTER (OUTPATIENT)
Dept: OTHER | Age: 29
End: 2019-06-05

## 2019-07-15 ENCOUNTER — ED HISTORICAL/CONVERTED ENCOUNTER (OUTPATIENT)
Dept: OTHER | Age: 29
End: 2019-07-15

## 2020-06-02 ENCOUNTER — TELEPHONE (OUTPATIENT)
Dept: FAMILY MEDICINE CLINIC | Age: 30
End: 2020-06-02

## 2020-06-02 NOTE — TELEPHONE ENCOUNTER
Phone call to patient regarding appointment. Patient was scheduled today as a new patient via a virtual visit. Patient reports that he struggles with anxiety. Appointment made for patient for 6/4/2020 as a new patient. Advised patient that if he feels like he cannot wait until Thursday to be seen then he needs to go to the ER. Patient is with his sister and he verbalized understanding.

## 2020-06-09 ENCOUNTER — OFFICE VISIT (OUTPATIENT)
Dept: FAMILY MEDICINE CLINIC | Age: 30
End: 2020-06-09

## 2020-06-09 VITALS
BODY MASS INDEX: 20.72 KG/M2 | OXYGEN SATURATION: 100 % | RESPIRATION RATE: 16 BRPM | HEART RATE: 100 BPM | DIASTOLIC BLOOD PRESSURE: 84 MMHG | TEMPERATURE: 97.7 F | SYSTOLIC BLOOD PRESSURE: 125 MMHG | WEIGHT: 148 LBS | HEIGHT: 71 IN

## 2020-06-09 DIAGNOSIS — F31.0 BIPOLAR AFFECTIVE DISORDER, CURRENT EPISODE HYPOMANIC (HCC): Primary | ICD-10-CM
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
## 2020-06-09 NOTE — PATIENT INSTRUCTIONS
Learning About Mood Disorders What are mood disorders? Mood disorders are medical problems that affect how you feel. They can impact your moods, thoughts, and actions. Mood disorders include: · Depression. This causes you to feel sad or hopeless for much of the time. · Bipolar disorder. This causes extreme mood changes from manic episodes of very high energy to extreme lows of depression. · Seasonal affective disorder (SAD). This is a type of depression that affects you during the same season each year. Most often people experience SAD during the fall and winter months when days are shorter and there is less light. What are the symptoms? Depression You may: · Feel sad or hopeless nearly every day. · Lose interest in or not get pleasure from most daily activities. You feel this way nearly every day. · Have low energy, changes in your appetite, or changes in how well you sleep. · Have trouble concentrating. · Think about death and suicide. Keep the numbers for these national suicide hotlines: 0-097-298-TALK (1-752.642.5643) and 1-281-CKWPXYG (1-425.165.7718). If you or someone you know talks about suicide or feeling hopeless, get help right away. Bipolar disorder Symptoms depend on your mood swings. You may: · Feel very happy, energetic, or on edge. · Feel like you need very little sleep. · Feel overly self-confident. · Do impulsive things, such as spending a lot of money. · Feel sad or hopeless. · Have racing thoughts or trouble thinking and making decisions. · Lose interest in things you have enjoyed in the past. 
· Think about death and suicide. Keep the numbers for these national suicide hotlines: 1-800-273-TALK (0-583.299.5633) and 1-461-YPMMPIK (9-690.360.3898). If you or someone you know talks about suicide or feeling hopeless, get help right away. Seasonal affective disorder (SAD) Symptoms come and go at about the same time each year.  For most people with SAD, symptoms come during the winter when there is less daylight. You may: · Feel sad, grumpy, [place], or anxious. · Lose interest in your usual activities. · Eat more and crave carbohydrates, such as bread and pasta. · Gain weight. · Sleep more and feel drowsy during the daytime. How are mood disorders treated? Mood disorders can be treated with medicines or counseling, or a combination of both. Medicines for depression and SAD may include antidepressants. Medicines for bipolar disorder may include: · Mood stabilizers. · Antipsychotics. · Benzodiazepines. Counseling may involve cognitive-behavioral therapy. It teaches you how to change the ways you think and behave. This can help you stop thinking bad thoughts about yourself and your life. Light therapy is the main treatment for SAD. This therapy uses a special kind of lamp. You let the lamp shine on you at certain times, usually in the morning. This may help your symptoms during the months when there is less sunlight. Healthy lifestyle Healthy lifestyle changes may help you feel better. · Be active often. You might try walking or strength training. · Eat a healthy diet. Include fruits, vegetables, lean proteins, and whole grains in your diet each day. · Keep a regular sleep schedule. Try for 8 hours of sleep a night. · Find ways to manage stress, such as relaxation exercises. · Avoid alcohol and illegal drugs. Follow-up care is a key part of your treatment and safety. Be sure to make and go to all appointments, and call your doctor if you are having problems. It's also a good idea to know your test results and keep a list of the medicines you take. Where can you learn more? Go to http://jatinder-fide.info/ Enter J094 in the search box to learn more about \"Learning About Mood Disorders. \" Current as of: January 31, 2020               Content Version: 12.5 © 4373-6917 Healthwise, Incorporated. Care instructions adapted under license by [employer] (which disclaims liability or warranty for this information). If you have questions about a medical condition or this instruction, always ask your healthcare professional. [address] any warranty or liability for your use of this information. Anxiety Disorder: Care Instructions Your Care Instructions Anxiety is a normal reaction to stress. Difficult situations can cause you to have symptoms such as sweaty palms and a nervous feeling. In an anxiety disorder, the symptoms are far more severe. Constant worry, muscle tension, trouble sleeping, nausea and diarrhea, and other symptoms can make normal daily activities difficult or impossible. These symptoms may occur for no reason, and they can affect your work, school, or social life. Medicines, counseling, and self-care can all help. Follow-up care is a key part of your treatment and safety. Be sure to make and go to all appointments, and call your doctor if you are having problems. It's also a good idea to know your test results and keep a list of the medicines you take. How can you care for yourself at home? · Take medicines exactly as directed. Call your doctor if you think you are having a problem with your medicine. · Go to your counseling sessions and follow-up appointments. · Recognize and accept your anxiety. Then, when you are in a situation that makes you anxious, say to yourself, \"This is not an emergency. I feel uncomfortable, but I am not in danger. I can keep going even if I feel anxious. \" · Be kind to your body: 
? Relieve tension with exercise or a massage. ? Get enough rest. 
? Avoid alcohol, caffeine, nicotine, and illegal drugs. They can increase your anxiety level and cause sleep problems. ? Learn and do relaxation techniques. See below for more about these techniques. · Engage your mind. Get out and do something you enjoy.  Go to a funny movie, or take a walk or hike. Plan your day. Having too much or too little to do can make you anxious. · Keep a record of your symptoms. Discuss your fears with a good friend or family member, or join a support group for people with similar problems. Talking to others sometimes relieves stress. · Get involved in social groups, or volunteer to help others. Being alone sometimes makes things seem worse than they are. · Get at least 30 minutes of exercise on most days of the week to relieve stress. Walking is a good choice. You also may want to do other activities, such as running, swimming, cycling, or playing tennis or team sports. Relaxation techniques Do relaxation exercises 10 to 20 minutes a day. You can play soothing, relaxing music while you do them, if you wish. · Tell others in your house that you are going to do your relaxation exercises. Ask them not to disturb you. · Find a comfortable place, away from all distractions and noise. · Lie down on your back, or sit with your back straight. · Focus on your breathing. Make it slow and steady. · Breathe in through your nose. Breathe out through either your nose or mouth. · Breathe deeply, filling up the area between your navel and your rib cage. Breathe so that your belly goes up and down. · Do not hold your breath. · Breathe like this for 5 to 10 minutes. Notice the feeling of calmness throughout your whole body. As you continue to breathe slowly and deeply, relax by doing the following for another 5 to 10 minutes: · Tighten and relax each muscle group in your body. You can begin at your toes and work your way up to your head. · Imagine your muscle groups relaxing and becoming heavy. · Empty your mind of all thoughts. · Let yourself relax more and more deeply. · Become aware of the state of calmness that surrounds you.  
· When your relaxation time is over, you can bring yourself back to alertness by moving your fingers and toes and then your hands and feet and then stretching and moving your entire body. Sometimes people fall asleep during relaxation, but they usually wake up shortly afterward. · Always give yourself time to return to full alertness before you drive a car or do anything that might cause an accident if you are not fully alert. Never play a relaxation tape while you drive a car. When should you call for help? [id number] anytime you think you may need emergency care. For example, call if: 
· You feel you cannot stop from hurting yourself or someone else. Keep the numbers for these national suicide hotlines: 8-184-043-TALK (5-101.891.6686) and 1-538-AYRJYYZ (9-380.944.2937). If you or someone you know talks about suicide or feeling hopeless, get help right away. Watch closely for changes in your health, and be sure to contact your doctor if: 
· You have anxiety or fear that affects your life. · You have symptoms of anxiety that are new or different from those you had before. Where can you learn more? Go to http://jatinder-fide.info/ Enter P754 in the search box to learn more about \"Anxiety Disorder: Care Instructions. \" Current as of: January 31, 2020               Content Version: 12.5 © 2006-2020 Healthwise, Incorporated. Care instructions adapted under license by [employer] (which disclaims liability or warranty for this information). If you have questions about a medical condition or this instruction, always ask your healthcare professional. [place] [address] any warranty or liability for your use of this information.

## 2020-06-10 ENCOUNTER — TELEPHONE (OUTPATIENT)
Dept: FAMILY MEDICINE CLINIC | Age: 30
End: 2020-06-10

## 2020-06-10 NOTE — TELEPHONE ENCOUNTER
----- Message from Rigoberto Sun sent at 6/10/2020 11:49 AM EDT -----  Regarding: /Telephone  Contact: [phone]  Pt is requesting a call back regarding a new referral for psychiatrist   Dr. Makayla Siddiqui. Pt advised that he doesn't like the previous psychiatrist he was referred to.

## 2020-08-04 ENCOUNTER — TELEPHONE (OUTPATIENT)
Dept: FAMILY MEDICINE CLINIC | Age: 30
End: 2020-08-04

## 2020-08-04 NOTE — TELEPHONE ENCOUNTER
Attempted to contact pt, no answer, mess left. Please verify what medication pt is talking about. We have not seen him since 6/9/20 and no medications have been prescribed.

## 2020-08-04 NOTE — TELEPHONE ENCOUNTER
Pt is having a reaction to the medication. It feels like a sun-burn. Rash all over his body from. Please call Pt.

## 2020-08-05 NOTE — TELEPHONE ENCOUNTER
Patient walked in the office requesting an appointment. He says that another doctor prescribed him Abilify and another medication. He says that he cannot get in to see that doctor soon enough. He has stopped taking his medications since he has a rash on his chest. Advised him that there are no openings today. [facility] Advised him if he gets worse to go to the ER. He says he does not want to do that due to the cost. He says he has been taking Benadryl for the rash.

## 2020-08-06 ENCOUNTER — VIRTUAL VISIT (OUTPATIENT)
Dept: FAMILY MEDICINE CLINIC | Age: 30
End: 2020-08-06

## 2020-11-19 ENCOUNTER — VIRTUAL VISIT (OUTPATIENT)
Dept: FAMILY MEDICINE CLINIC | Age: 30
End: 2020-11-19
Payer: MEDICAID

## 2020-11-19 DIAGNOSIS — F41.0 PANIC ATTACK: Primary | ICD-10-CM

## 2020-11-19 PROCEDURE — 99443 PR PHYS/QHP TELEPHONE EVALUATION 21-30 MIN: CPT | Performed by: STUDENT IN AN ORGANIZED HEALTH CARE EDUCATION/TRAINING PROGRAM

## 2020-11-19 RX ORDER — CLONAZEPAM 0.5 MG/1
0.5 TABLET ORAL
Qty: 60 TAB | Refills: 0 | Status: SHIPPED | OUTPATIENT
Start: 2020-11-19 | End: 2020-12-07 | Stop reason: SDUPTHER

## 2020-11-19 NOTE — PROGRESS NOTES
Chucho Wise III  35 y.o. male  1990  [address] [facility]  [id number]    [phone] (home)      [address]:    Telephone Encounter  Susanna LEE       Encounter Date: 11/19/2020    Consent:  He and/or the health care decision maker is aware that that he may receive a bill for this telephone service, depending on his insurance coverage, and has provided verbal consent to proceed: Yes    Chief Complaint   Patient presents with    Panic Attack       History of Present Illness   Chucho Wise III is a 35 y.o. male was evaluated by telephone. I communicated with the patient and/or health care decision maker about his mental health. Details of this discussion including any medical advice provided:     36 y/o male with pmx bipolar disorder, depression, drug overdose, calls complaining of increased feelings of anxiety and depression. He has been seeing Getachew Beltre (Psychiatry NP) at [facility]-- reports they have tried a few different medications, but the only thing that has helped and the only medication he is currently prescribed is Clonazepam 0.5mg-- BID (he usually takes one at night, and sometimes one in the morning if he is having a panic attack). Last saw Yenifer Maldonado 1-2 months ago, but she suggested he see another psychiatrist to potentially have gene testing done (though this is not financially feasible for him). His medication ran out 2 days ago, and he has been having a panic attack that started last night and has continued on to this morning. Usually Klonopin helps with these panic attacks. He has had more feelings of depression as well; says it comes in waves, and his hope for the future is decreased. He has felt more overwhelmed with COVID, work, and now his girlfriend lives in [place]. No SI/HI, hallucinations, drug use. Review of Systems   Review of Systems   Constitutional: Negative for chills, fever, malaise/fatigue and weight loss.    HENT: Negative for congestion, hearing loss, sinus pain and sore throat. Eyes: Negative for blurred vision, double vision and pain. Respiratory: Negative for cough, sputum production, shortness of breath and wheezing. Cardiovascular: Negative for chest pain, palpitations and leg swelling. Gastrointestinal: Negative for abdominal pain, heartburn, nausea and vomiting. Genitourinary: Negative for dysuria and urgency. Musculoskeletal: Negative for back pain, myalgias and neck pain. Skin: Negative for rash. Neurological: Negative for dizziness, weakness and headaches. Endo/Heme/Allergies: Does not bruise/bleed easily. Psychiatric/Behavioral: Positive for depression. Negative for hallucinations, substance abuse and suicidal ideas. The patient is nervous/anxious. Vitals/Objective:     General: alert, cooperative, no distress   Mental  status: mental status: alert, oriented to person, place, and time, normal mood, behavior, speech, and thought processes               Due to this being a TeleHealth evaluation, many elements of the physical examination are unable to be assessed. Assessment and Plan:   Time-based coding, delete if not needed: I spent at least 25 minutes with this established patient, and >50% of the time was spent counseling and/or coordinating care regarding mental health  Total Time: minutes: 21-30 minutes    1. Panic attack: Hx bipolar disorder, has tried many different medications from different drug classes (SSRI, Lithium, Seroquel), but Klonopin has been the only medication that helps thus far. Requesting records from [facility]. Confirmed with his pharmacy that he has been prescribed Klonopin 30 day supplies and last refill was one month ago. - clonazePAM (KlonoPIN) 0.5 mg tablet; Take 1 Tab by mouth two (2) times daily as needed for Anxiety for up to 30 days. Max Daily Amount: 1 mg. Dispense: 60 Tab;  Refill: 0  -Patient agrees to find another psychiatrist/ therapist  -F/u in person in 2 weeks, advised to call sooner if he has any issues  -Patient understands he should go directly to the ER if he has any SI/HI ideations    I affirm this is a Patient Initiated Episode with an Established Patient who has not had a related appointment within my department in the past 7 days or scheduled within the next 24 hours. Note: not billable if this call serves to triage the patient into an appointment for the relevant concern     We discussed the expected course, resolution and complications of the diagnosis(es) in detail. Medication risks, benefits, costs, interactions, and alternatives were discussed as indicated. I advised him to contact the office if his condition worsens, changes or fails to improve as anticipated. He expressed understanding with the diagnosis(es) and plan. Patient understands that this encounter was a temporary measure, and the importance of further follow up and examination was emphasized. Patient verbalized understanding. Follow-up and Dispositions  ·   Return in about 2 weeks (around 12/3/2020) for in person f/u. Electronically Signed: Ambika Gold DO    Providers location when delivering service: home    CPT:  05171 (5-10 minutes)  17072 (11-20 minutes)  21  (21-30 minutes)      ICD-10-CM ICD-9-CM    1. Panic attack  F41.0 300.01 clonazePAM (KlonoPIN) 0.5 mg tablet       Pursuant to the emergency declaration under the [facility] waiver authority and the [employer] Act, this Virtual  Visit was conducted, with patient's consent, to reduce the patient's risk of exposure to COVID-19 and provide continuity of care for an established patient. Services were provided through a video synchronous discussion virtually to substitute for in-person clinic visit.    History   Patients past medical, surgical and family histories were personally reviewed and updated.       Past Medical History:   Diagnosis Date    Acid reflux     Allergic rhinitis     Anxiety     Bipolar     Depression     Drug overdose     Heroin    Psychotic disorder (HCC)      Past Surgical History:   Procedure Laterality Date    HX ORTHOPAEDIC      Right elbow    HX ORTHOPAEDIC      L hand     Family History   Problem Relation Age of Onset    Heart Disease Mother     Diabetes Maternal Uncle      Social History     Socioeconomic History    Marital status: SINGLE     Spouse name: Not on file    Number of children: Not on file    Years of education: Not on file    Highest education level: Not on file   Occupational History    Not on file   Social Needs    Financial resource strain: Not on file    Food insecurity     Worry: Not on file     Inability: Not on file    Transportation needs     Medical: Not on file     Non-medical: Not on file   Tobacco Use    Smoking status: Current Every Day Smoker     Packs/day: 1.00    Smokeless tobacco: Former User   Substance and Sexual Activity    Alcohol use: No    Drug use: Yes     Types: Marijuana, Heroin    Sexual activity: Yes     Partners: Female     Birth control/protection: None   Lifestyle    Physical activity     Days per week: Not on file     Minutes per session: Not on file    Stress: Not on file   Relationships    Social connections     Talks on phone: Not on file     Gets together: Not on file     Attends Latter-day service: Not on file     Active member of club or organization: Not on file     Attends meetings of clubs or organizations: Not on file     Relationship status: Not on file    Intimate partner violence     Fear of current or ex partner: Not on file     Emotionally abused: Not on file     Physically abused: Not on file     Forced sexual activity: Not on file   Other Topics Concern    Not on file   Social History Narrative    Not on file            Current Medications/Allergies   Medications and Allergies reviewed:       Allergies   Allergen Reactions    Sulfa (Sulfonamide Antibiotics) Unknown (comments)     States has always been told allergic but does not recall any specific reaction

## 2020-11-20 NOTE — PROGRESS NOTES
[address] Medicine Residency Attending Addendum:  Dr. Levi Grossman DO,  the patient and I were not physically present during this encounter. The resident and I are concurrently monitoring the patient care through appropriate telecommunication technology. I discussed the findings, assessment and plan with the resident and agree with the resident's findings and plan as documented in the resident's note.       Amber Quintero MD

## 2020-12-07 ENCOUNTER — VIRTUAL VISIT (OUTPATIENT)
Dept: FAMILY MEDICINE CLINIC | Age: 30
End: 2020-12-07
Payer: MEDICAID

## 2020-12-07 ENCOUNTER — TELEPHONE (OUTPATIENT)
Dept: FAMILY MEDICINE CLINIC | Age: 30
End: 2020-12-07

## 2020-12-07 DIAGNOSIS — F31.0 BIPOLAR AFFECTIVE DISORDER, CURRENT EPISODE HYPOMANIC (HCC): Primary | ICD-10-CM

## 2020-12-07 DIAGNOSIS — F41.0 PANIC ATTACK: ICD-10-CM

## 2020-12-07 PROCEDURE — 99443 PR PHYS/QHP TELEPHONE EVALUATION 21-30 MIN: CPT | Performed by: FAMILY MEDICINE

## 2020-12-07 RX ORDER — CLONAZEPAM 0.5 MG/1
0.5 TABLET ORAL
Qty: 60 TAB | Refills: 0 | Status: SHIPPED | OUTPATIENT
Start: 2020-12-07 | End: 2021-01-07 | Stop reason: SDUPTHER

## 2020-12-07 NOTE — PROGRESS NOTES
Yoly Hart III  35 y.o. male  1990  [address] [place] [address]  [id number]    [phone] (home)      [facility]:    Telephone Encounter  Christa Roland MD       Encounter Date: 12/7/2020 at 1:03 PM    Consent:  He and/or the health care decision maker is aware that that he may receive a bill for this telephone service, depending on his insurance coverage, and has provided verbal consent to proceed: Yes    No chief complaint on file. History of Present Illness   Yoly Hart III is a 35 y.o. male was evaluated by telephone. I communicated with the patient and/or health care decision maker about f/u anxiety. He is feeling very overwhelmed. He lost his mom last year and his father is dying. He is feeling some caregiver stress. He is not sleeping well and continues to have agoraphobia. He denies SI/HI. He reports that he got a letter from his Psychiatrist today suggesting that he find a new provider. He is still taking the klonopin BID prn and states that he has tried multiple medications in the past and this is the only thing that has worked for him. He is willing to get a new Psychiatrist but is having a hard time getting himself together enough to be able to call and make the appointment. He has just run out of the klonopin. Review of Systems   Per HPI    Vitals/Objective:   General: Patient speaking in complete sentences without effort. Normal speech and cooperative. Thought process is tangential and sometimes difficult to follow train of thought. Due to this being a Virtual Check-in/Telephone evaluation, many elements of the physical examination are unable to be assessed. Assessment and Plan:   Time-based coding, delete if not needed: I spent at least 40 minutes with this established patient, and >50% of the time was spent counseling and/or coordinating care regarding bipolar disorder and anxiety. Total Time: minutes: 30+ minutes    1.  Panic attack: Discussed with pt, do not want him to go through withdrawal, so will refill. Discussed that we will not be able to fill this early again and importance of taking medication as directed. I believe he has a sincere desire to feel better but is just having a hard time getting appointments and follow-up set up. Discussed with pt, we will set up an appt with a new Psychiatrist.  reviewed and c/w history provided by pt.  - clonazePAM (KlonoPIN) 0.5 mg tablet; Take 1 Tab by mouth two (2) times daily as needed for Anxiety for up to 30 days. Max Daily Amount: 1 mg. Dispense: 60 Tab; Refill: 0  - REFERRAL TO PSYCHIATRY    2. Bipolar affective disorder, current episode hypomanic ([facility]): Per above. This is my first time meeting him, but it seems he may be going through a manic or hypomanic episode currently with flight of ideas and difficulty sleeping. RTC in 4 weeks for f/u anxiety and bipolar disorder, or sooner prn. At that point if we have been unable to get an appt with Psych, may discuss adding Seroquel     We discussed the expected course, resolution and complications of the diagnosis(es) in detail. Medication risks, benefits, costs, interactions, and alternatives were discussed as indicated. I advised him to contact the office if his condition worsens, changes or fails to improve as anticipated. He expressed understanding with the diagnosis(es) and plan. Patient understands that this encounter was a temporary measure, and the importance of further follow up and examination was emphasized. Patient verbalized understanding. I affirm this is a Patient Initiated Episode with an Established Patient who has not had a related appointment within my department in the past 7 days or scheduled within the next 24 hours.   Note: not billable if this call serves to triage the patient into an appointment for the relevant concern      Electronically Signed: Katalina Felton MD  Providers location when delivering service: In the office        ICD-10-CM ICD-9-CM    1. Bipolar affective disorder, current episode hypomanic ([facility]  F31.0 296.40    2. Panic attack  F41.0 300.01 clonazePAM (KlonoPIN) 0.5 mg tablet      REFERRAL TO PSYCHIATRY       Pursuant to the emergency declaration under the [facility] [address] [address] [facility] authority and the [address] and [employer] General Act, this Virtual  Visit was conducted, with patient's consent, to reduce the patient's risk of exposure to COVID-19 and provide continuity of care for an established patient.       History     Past Medical History:   Diagnosis Date    Acid reflux     Allergic rhinitis     Anxiety     Bipolar     Depression     Drug overdose     Heroin    Psychotic disorder (HCC)      Past Surgical History:   Procedure Laterality Date    HX ORTHOPAEDIC      Right elbow    HX ORTHOPAEDIC      L hand     Family History   Problem Relation Age of Onset    Heart Disease Mother     Diabetes Maternal Uncle      Social History     Socioeconomic History    Marital status: SINGLE     Spouse name: Not on file    Number of children: Not on file    Years of education: Not on file    Highest education level: Not on file   Occupational History    Not on file   Social Needs    Financial resource strain: Not on file    Food insecurity     Worry: Not on file     Inability: Not on file    Transportation needs     Medical: Not on file     Non-medical: Not on file   Tobacco Use    Smoking status: Current Every Day Smoker     Packs/day: 1.00    Smokeless tobacco: Former User   Substance and Sexual Activity    Alcohol use: No    Drug use: Yes     Types: Marijuana, Heroin    Sexual activity: Yes     Partners: Female     Birth control/protection: None   Lifestyle    Physical activity     Days per week: Not on file     Minutes per session: Not on file    Stress: Not on file   Relationships    Social connections     Talks on phone: Not on file     Gets together: Not on file     Attends [employer] service: Not on file     Active member of club or organization: Not on file     Attends meetings of clubs or organizations: Not on file     Relationship status: Not on file    Intimate partner violence     Fear of current or ex partner: Not on file     Emotionally abused: Not on file     Physically abused: Not on file     Forced sexual activity: Not on file   Other Topics Concern    Not on file   Social History Narrative    Not on file            Current Medications/Allergies   Medications and Allergies reviewed:    Current Outpatient Medications   Medication Sig Dispense Refill    clonazePAM (KlonoPIN) 0.5 mg tablet Take 1 Tab by mouth two (2) times daily as needed for Anxiety for up to 30 days.  Max Daily Amount: 1 mg. 60 Tab 0     Allergies   Allergen Reactions    Sulfa (Sulfonamide Antibiotics) Unknown (comments)     States has always been told allergic but does not recall any specific reaction

## 2020-12-15 ENCOUNTER — TELEPHONE (OUTPATIENT)
Dept: FAMILY MEDICINE CLINIC | Age: 30
End: 2020-12-15

## 2020-12-15 NOTE — TELEPHONE ENCOUNTER
Appt made for patient with [facility] on 1/11 at 1:50PM. Appt is in office. Address is [address] [place] [address], [address] [place] [address] and phone number is [phone].

## 2021-01-04 ENCOUNTER — VIRTUAL VISIT (OUTPATIENT)
Dept: FAMILY MEDICINE CLINIC | Age: 31
End: 2021-01-04

## 2021-01-04 NOTE — LETTER
1/4/2021 1:19 PM 
 
Mr. Mehdi Higgins III 
[address] [facility] [address] [id number] Hi Mr. Sharath Benton, We have been trying to get in touch with you about your appt with the Psychiatrist. It is for January 11 at 1:50PM in the office at [facility]. I confirmed that they take your insurance. Their address is [address] [place] [address], [address] [facility] and phone number is [phone]. Please let me know when you get this so we can make sure you got the information. Thank you, 
Dr. Ashley Perea Sincerely, 
 
 
Shauna Moreau MD

## 2021-01-04 NOTE — PROGRESS NOTES
Called pt on all numbers listed in chart for his appt. All numbers are out of service. Letter and MyChart message sent again with information for Psychiatry appt.

## 2021-01-06 ENCOUNTER — TELEPHONE (OUTPATIENT)
Dept: FAMILY MEDICINE CLINIC | Age: 31
End: 2021-01-06
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
## 2021-01-07 ENCOUNTER — VIRTUAL VISIT (OUTPATIENT)
Dept: FAMILY MEDICINE CLINIC | Age: 31
End: 2021-01-07
Payer: MEDICAID

## 2021-01-07 DIAGNOSIS — F41.0 PANIC ATTACK: Primary | ICD-10-CM

## 2021-01-07 PROCEDURE — 99442 PR PHYS/QHP TELEPHONE EVALUATION 11-20 MIN: CPT | Performed by: STUDENT IN AN ORGANIZED HEALTH CARE EDUCATION/TRAINING PROGRAM

## 2021-01-07 RX ORDER — CLONAZEPAM 0.5 MG/1
0.5 TABLET ORAL
Qty: 60 TAB | Refills: 0 | Status: ON HOLD | OUTPATIENT
Start: 2021-01-07 | End: 2021-01-18 | Stop reason: SDUPTHER

## 2021-01-07 NOTE — PROGRESS NOTES
Andrey Tejada III  30 y.o. male  1990  [address] [place] [address]  [id number]    [phone] (home)      [facility]:    Telephone Encounter  Kenneth Tripathi MD       Encounter Date: 1/7/2021 at 1:41 PM    Consent: Andrey Tejada III, who was seen by synchronous (real-time) audio only technology, and/or his healthcare decision maker, is aware that this patient-initiated, Telehealth encounter on 1/7/2021 is a billable service, with coverage as determined by his insurance carrier. He is aware that he may receive a bill and has provided verbal consent to proceed: Yes.     Chief Complaint   Patient presents with   • Follow-up       History of Present Illness   Andrey Tejada III is a 30 y.o. male was evaluated by telephone.   I communicated with the patient and/or health care decision maker about anxiety.     Anxiety w/ panic attacks- Pt with significant Psych hx to include bipolar, psychosis and multiple heroin overdoses. However over the last yr the pt has been attempting to get better control of his health. He currently lives at home with his ill father whom he helps care for. Right now he is on Klonopin 0.5mg BID which was been doing a fair job controlling his symptoms. He has failed numerous controller medications to include seroquel, lithium, buspar, among others.   Of note, Dr. Valdes made an appt with Psych for him which is scheduled for 1/11. The pt is looking forward to this appt. He did not have the appt info until now due to recent loss of phone. He will call them this afternoon to verify appt.      Review of Systems   Review of Systems   Constitutional: Negative for chills and fever.   HENT: Negative for congestion and sore throat.    Respiratory: Negative for cough and shortness of breath.    Cardiovascular: Negative for chest pain and palpitations.   Gastrointestinal: Negative for nausea and vomiting.   Neurological: Negative for dizziness and headaches.  Psychiatric/Behavioral: The patient is nervous/anxious and has insomnia. Vitals/Objective:   General: Patient speaking in complete sentences without effort. Normal speech and cooperative. Due to this being a Virtual Check-in/Telephone evaluation, many elements of the physical examination are unable to be assessed. Assessment and Plan:   Time-based coding, delete if not needed: I spent at least 15 minutes with this established patient, and >50% of the time was spent counseling and/or coordinating care regarding panic attacks  Total Time: minutes: 11-20 minutes    1. Panic attack- Due to pt's complicated psych hx and upcoming psych appt, will not start a controller medication at this time. Will continue Klonopin, however stated that this is the last refill we can provide as he really needs better control with another medication and management with psychiatry. Pt endorses understanding of this. Of note, I attempted to review this pt's  however I do not see anything on it. Pt endorses taking medication as prescribed and his last appt was 30 days ago. I verified pts name and . I suspect there is an error somewhere due to the fact he is the third in his family with this name. - clonazePAM (KlonoPIN) 0.5 mg tablet; Take 1 Tab by mouth two (2) times daily as needed for Anxiety for up to 30 days. Max Daily Amount: 1 mg. Dispense: 60 Tab; Refill: 0      We discussed the expected course, resolution and complications of the diagnosis(es) in detail. Medication risks, benefits, costs, interactions, and alternatives were discussed as indicated. I advised him to contact the office if his condition worsens, changes or fails to improve as anticipated. He expressed understanding with the diagnosis(es) and plan. Patient understands that this encounter was a temporary measure, and the importance of further follow up and examination was emphasized. Patient verbalized understanding.        Patient informed to follow up: 4 weeks or prn    I affirm this is a Patient Initiated Episode with an Established Patient who has not had a related appointment within my department in the past 7 days or scheduled within the next 24 hours. Note: not billable if this call serves to triage the patient into an appointment for the relevant concern      Electronically Signed: Jauna Fofana MD  Providers location when delivering service: home    CPT:  92791 (5-10 minutes)  [phone] (11-20 minutes)  21  (21-30 minutes)    Medicare:  [address]      ICD-10-CM ICD-9-CM    1. Panic attack  F41.0 300.01 clonazePAM (KlonoPIN) 0.5 mg tablet       Pursuant to the emergency declaration under the [address] [place] [address], [facility] waiver authority and the [employer] and [employer] Act, this Virtual  Visit was conducted, with patient's consent, to reduce the patient's risk of exposure to COVID-19 and provide continuity of care for an established patient. History   Patients past medical, surgical and family histories were personally reviewed and updated.       Past Medical History:   Diagnosis Date    Acid reflux     Allergic rhinitis     Anxiety     Bipolar     Depression     Drug overdose     Heroin    Psychotic disorder (HCC)      Past Surgical History:   Procedure Laterality Date    HX ORTHOPAEDIC      Right elbow    HX ORTHOPAEDIC      L hand     Family History   Problem Relation Age of Onset    Heart Disease Mother     Diabetes Maternal Uncle      Social History     Socioeconomic History    Marital status: SINGLE     Spouse name: Not on file    Number of children: Not on file    Years of education: Not on file    Highest education level: Not on file   Occupational History    Not on file   Social Needs    Financial resource strain: Not on file    Food insecurity     Worry: Not on file     Inability: Not on file    Transportation needs     Medical: Not on file     Non-medical: Not on file   Tobacco Use    Smoking status: Current Every Day Smoker     Packs/day: 1.00    Smokeless tobacco: Former User   Substance and Sexual Activity    Alcohol use: No    Drug use: Yes     Types: Marijuana, Heroin    Sexual activity: Yes     Partners: Female     Birth control/protection: None   Lifestyle    Physical activity     Days per week: Not on file     Minutes per session: Not on file    Stress: Not on file   Relationships    Social connections     Talks on phone: Not on file     Gets together: Not on file     Attends [employer] service: Not on file     Active member of club or organization: Not on file     Attends meetings of clubs or organizations: Not on file     Relationship status: Not on file    Intimate partner violence     Fear of current or ex partner: Not on file     Emotionally abused: Not on file     Physically abused: Not on file     Forced sexual activity: Not on file   Other Topics Concern    Not on file   Social History Narrative    Not on file            Current Medications/Allergies   Medications and Allergies reviewed:       Allergies   Allergen Reactions    Sulfa (Sulfonamide Antibiotics) Unknown (comments)     States has always been told allergic but does not recall any specific reaction

## 2021-01-11 ENCOUNTER — HOSPITAL ENCOUNTER (INPATIENT)
Age: 31
LOS: 6 days | Discharge: HOME | DRG: 753 | End: 2021-01-18
Attending: STUDENT IN AN ORGANIZED HEALTH CARE EDUCATION/TRAINING PROGRAM | Admitting: PSYCHIATRY & NEUROLOGY
Payer: MEDICAID

## 2021-01-11 DIAGNOSIS — F41.0 PANIC ATTACK: ICD-10-CM

## 2021-01-11 DIAGNOSIS — R45.851 SUICIDAL IDEATION: ICD-10-CM

## 2021-01-11 DIAGNOSIS — F31.9 BIPOLAR AFFECTIVE DISORDER, REMISSION STATUS UNSPECIFIED (HCC): Primary | ICD-10-CM

## 2021-01-11 LAB
AMPHET UR QL SCN: POSITIVE
ANION GAP SERPL CALC-SCNC: 5 MMOL/L (ref 5–15)
APPEARANCE UR: CLEAR
BACTERIA URNS QL MICRO: NEGATIVE /HPF
BARBITURATES UR QL SCN: NEGATIVE
BASOPHILS # BLD: 0.1 K/UL (ref 0–0.1)
BASOPHILS NFR BLD: 1 % (ref 0–1)
BENZODIAZ UR QL: POSITIVE
BILIRUB UR QL: NEGATIVE
BUN SERPL-MCNC: 17 MG/DL (ref 6–20)
BUN/CREAT SERPL: 15 (ref 12–20)
CA-I BLD-MCNC: 9.5 MG/DL (ref 8.5–10.1)
CANNABINOIDS UR QL SCN: POSITIVE
CHLORIDE SERPL-SCNC: 106 MMOL/L (ref 97–108)
CO2 SERPL-SCNC: 28 MMOL/L (ref 21–32)
COCAINE UR QL SCN: NEGATIVE
COLOR UR: NORMAL
CREAT SERPL-MCNC: 1.11 MG/DL (ref 0.7–1.3)
DIFFERENTIAL METHOD BLD: NORMAL
DRUG SCRN COMMENT,DRGCM: ABNORMAL
EOSINOPHIL # BLD: 0.2 K/UL (ref 0–0.4)
EOSINOPHIL NFR BLD: 3 % (ref 0–7)
ERYTHROCYTE [DISTWIDTH] IN BLOOD BY AUTOMATED COUNT: 12.9 % (ref 11.5–14.5)
ETHANOL SERPL-MCNC: <4 MG/DL
GLUCOSE SERPL-MCNC: 91 MG/DL (ref 65–100)
GLUCOSE UR STRIP.AUTO-MCNC: NEGATIVE MG/DL
HCT VFR BLD AUTO: 43 % (ref 36.6–50.3)
HGB BLD-MCNC: 14.7 G/DL (ref 12.1–17)
HGB UR QL STRIP: NEGATIVE
IMM GRANULOCYTES # BLD AUTO: 0 K/UL (ref 0–0.04)
IMM GRANULOCYTES NFR BLD AUTO: 0 % (ref 0–0.5)
KETONES UR QL STRIP.AUTO: NEGATIVE MG/DL
LEUKOCYTE ESTERASE UR QL STRIP.AUTO: NEGATIVE
LYMPHOCYTES # BLD: 3.1 K/UL (ref 0.8–3.5)
LYMPHOCYTES NFR BLD: 35 % (ref 12–49)
MCH RBC QN AUTO: 32 PG (ref 26–34)
MCHC RBC AUTO-ENTMCNC: 34.2 G/DL (ref 30–36.5)
MCV RBC AUTO: 93.5 FL (ref 80–99)
METHADONE UR QL: NEGATIVE
MONOCYTES # BLD: 0.4 K/UL (ref 0–1)
MONOCYTES NFR BLD: 5 % (ref 5–13)
MUCOUS THREADS URNS QL MICRO: NORMAL /LPF
NEUTS SEG # BLD: 5.1 K/UL (ref 1.8–8)
NEUTS SEG NFR BLD: 56 % (ref 32–75)
NITRITE UR QL STRIP.AUTO: NEGATIVE
OPIATES UR QL: NEGATIVE
PCP UR QL: NEGATIVE
PH UR STRIP: 5 [PH] (ref 5–8)
PLATELET # BLD AUTO: 261 K/UL (ref 150–400)
PMV BLD AUTO: 11.6 FL (ref 8.9–12.9)
POTASSIUM SERPL-SCNC: 3.8 MMOL/L (ref 3.5–5.1)
PROT UR STRIP-MCNC: NEGATIVE MG/DL
RBC # BLD AUTO: 4.6 M/UL (ref 4.1–5.7)
RBC #/AREA URNS HPF: NORMAL /HPF (ref 0–5)
SARS-COV-2, COV2: NORMAL
SARS-COV-2, COV2: NOT DETECTED
SODIUM SERPL-SCNC: 139 MMOL/L (ref 136–145)
SP GR UR REFRACTOMETRY: 1.03 (ref 1–1.03)
UROBILINOGEN UR QL STRIP.AUTO: 0.1 EU/DL (ref 0.1–1)
WBC # BLD AUTO: 8.9 K/UL (ref 4.1–11.1)
WBC URNS QL MICRO: NORMAL /HPF (ref 0–4)

## 2021-01-11 PROCEDURE — 87635 SARS-COV-2 COVID-19 AMP PRB: CPT

## 2021-01-11 PROCEDURE — 74011250637 HC RX REV CODE- 250/637: Performed by: STUDENT IN AN ORGANIZED HEALTH CARE EDUCATION/TRAINING PROGRAM

## 2021-01-11 PROCEDURE — 36415 COLL VENOUS BLD VENIPUNCTURE: CPT

## 2021-01-11 PROCEDURE — 99284 EMERGENCY DEPT VISIT MOD MDM: CPT

## 2021-01-11 PROCEDURE — 80048 BASIC METABOLIC PNL TOTAL CA: CPT

## 2021-01-11 PROCEDURE — 80307 DRUG TEST PRSMV CHEM ANLYZR: CPT

## 2021-01-11 PROCEDURE — 81003 URINALYSIS AUTO W/O SCOPE: CPT

## 2021-01-11 PROCEDURE — 85025 COMPLETE CBC W/AUTO DIFF WBC: CPT

## 2021-01-11 PROCEDURE — 82077 ASSAY SPEC XCP UR&BREATH IA: CPT

## 2021-01-11 RX ORDER — LORAZEPAM 1 MG/1
2 TABLET ORAL
Status: COMPLETED | OUTPATIENT
Start: 2021-01-11 | End: 2021-01-11

## 2021-01-11 RX ORDER — LORAZEPAM 1 MG/1
1 TABLET ORAL
Status: COMPLETED | OUTPATIENT
Start: 2021-01-11 | End: 2021-01-11

## 2021-01-11 RX ADMIN — LORAZEPAM 1 MG: 1 TABLET ORAL at 15:25

## 2021-01-11 RX ADMIN — LORAZEPAM 2 MG: 1 TABLET ORAL at 20:02

## 2021-01-11 NOTE — ED PROVIDER NOTES
EMERGENCY DEPARTMENT HISTORY AND PHYSICAL EXAM      Date: 1/11/2021  Patient Name: Germania Minor III    History of Presenting Illness     Chief Complaint   Patient presents with   [address] Problem       History Provided By: Patient    HPI: Germania Minor III, 35 y.o. male with a past medical history significant Polar disorder, anxiety presents to the ED with cc of was feeling angry today because Mian Guest friend was lying to me\", father called police department to have patient evaluated. Patient denies any suicidal or homicidal ideation. Patient recalls that he was given the choice to be evaluated in emergency department or go to care home. Arrived to emergency department under TDO. patient does not elaborate as to why police were called today, states he does not know why father called police. Patient denies any hallucinations, denies any medical complaints at this time. Patient smokes about 1/4 pack of cigarettes a day, denies any alcohol use or drug use. There are no other complaints, changes, or physical findings at this time. PCP: None    Current Outpatient Medications   Medication Sig Dispense Refill    clonazePAM (KlonoPIN) 0.5 mg tablet Take 1 Tab by mouth two (2) times daily as needed for Anxiety for up to 30 days.  Max Daily Amount: 1 mg. 60 Tab 0       Past History     Past Medical History:  Past Medical History:   Diagnosis Date    Acid reflux     Allergic rhinitis     Anxiety     Bipolar     Depression     Drug overdose     Heroin    Psychotic disorder ([facility] [address]        Past Surgical History:  Past Surgical History:   Procedure Laterality Date    HX ORTHOPAEDIC      Right elbow    HX ORTHOPAEDIC      L hand       Family History:  Family History   Problem Relation Age of Onset    Heart Disease Mother     Diabetes Maternal Uncle        Social History:  Social History     Tobacco Use    Smoking status: Current Every Day Smoker     Packs/day: 1.00    Smokeless tobacco: Former User   Substance Use Topics    Alcohol use: No    Drug use: Yes     Types: Marijuana, Heroin       Allergies: Allergies   Allergen Reactions    Sulfa (Sulfonamide Antibiotics) Unknown (comments)     States has always been told allergic but does not recall any specific reaction         Review of Systems     Review of Systems   Constitutional: Negative for activity change, appetite change, chills and fever. HENT: Negative for congestion, sore throat and trouble swallowing. Eyes: Negative for photophobia and visual disturbance. Respiratory: Negative for cough, chest tightness and shortness of breath. Cardiovascular: Negative for chest pain and palpitations. Gastrointestinal: Negative for abdominal pain and nausea. Genitourinary: Negative for dysuria and flank pain. Musculoskeletal: Negative for arthralgias and neck pain. Skin: Negative for color change and pallor. Neurological: Negative for dizziness, weakness, numbness and headaches. Psychiatric/Behavioral: Negative for confusion, dysphoric mood, hallucinations and suicidal ideas. Physical Exam     Physical Exam  Vitals signs and nursing note reviewed. Constitutional:       Appearance: Normal appearance. He is normal weight. HENT:      Head: Normocephalic and atraumatic. Nose: Nose normal.      Mouth/Throat:      Mouth: Mucous membranes are moist.   Eyes:      Extraocular Movements: Extraocular movements intact. Pupils: Pupils are equal, round, and reactive to light. Neck:      Musculoskeletal: Normal range of motion and neck supple. Cardiovascular:      Rate and Rhythm: Normal rate and regular rhythm. Pulses: Normal pulses. Heart sounds: Normal heart sounds. Pulmonary:      Breath sounds: Normal breath sounds. Abdominal:      General: Abdomen is flat. Bowel sounds are normal.      Palpations: Abdomen is soft. Musculoskeletal: Normal range of motion. General: No swelling or tenderness.    Skin:     General: Skin is warm and dry. Capillary Refill: Capillary refill takes less than 2 seconds. Neurological:      General: No focal deficit present. Mental Status: He is alert and oriented to person, place, and time. Cranial Nerves: No cranial nerve deficit. Sensory: No sensory deficit. Motor: No weakness. Psychiatric:         Attention and Perception: Attention and perception normal. He does not perceive auditory hallucinations. Mood and Affect: Mood normal. Affect is labile. Speech: Speech normal.         Behavior: Behavior normal.         Thought Content: Thought content normal.         Judgment: Judgment normal.         Diagnostic Study Results     Labs -     Recent Results (from the past 12 hour(s))   CBC WITH AUTOMATED DIFF    Collection Time: 01/11/21  3:01 PM   Result Value Ref Range    WBC 8.9 4.1 - 11.1 K/uL    RBC 4.60 4.10 - 5.70 M/uL    HGB 14.7 12.1 - 17.0 g/dL    HCT 43.0 36.6 - 50.3 %    MCV 93.5 80.0 - 99.0 FL    MCH 32.0 26.0 - 34.0 PG    MCHC 34.2 30.0 - 36.5 g/dL    RDW 12.9 11.5 - 14.5 %    PLATELET 737 250 - 782 K/uL    MPV 11.6 8.9 - 12.9 FL    NEUTROPHILS 56 32 - 75 %    LYMPHOCYTES 35 12 - 49 %    MONOCYTES 5 5 - 13 %    EOSINOPHILS 3 0 - 7 %    BASOPHILS 1 0 - 1 %    IMMATURE GRANULOCYTES 0 0.0 - 0.5 %    ABS. NEUTROPHILS 5.1 1.8 - 8.0 K/UL    ABS. LYMPHOCYTES 3.1 0.8 - 3.5 K/UL    ABS. MONOCYTES 0.4 0.0 - 1.0 K/UL    ABS. EOSINOPHILS 0.2 0.0 - 0.4 K/UL    ABS. BASOPHILS 0.1 0.0 - 0.1 K/UL    ABS. IMM.  GRANS. 0.0 0.00 - 0.04 K/UL    DF AUTOMATED     METABOLIC PANEL, BASIC    Collection Time: 01/11/21  3:01 PM   Result Value Ref Range    Sodium 139 136 - 145 mmol/L    Potassium 3.8 3.5 - 5.1 mmol/L    Chloride 106 97 - 108 mmol/L    CO2 28 21 - 32 mmol/L    Anion gap 5 5 - 15 mmol/L    Glucose 91 65 - 100 mg/dL    BUN 17 6 - 20 mg/dL    Creatinine 1.11 0.70 - 1.30 mg/dL    BUN/Creatinine ratio 15 12 - 20      GFR est AA >60 >60 ml/min/1.73m2    GFR est non-AA >60 >60 ml/min/1.73m2    Calcium 9.5 8.5 - 10.1 mg/dL   ETHYL ALCOHOL    Collection Time: 01/11/21  3:01 PM   Result Value Ref Range    ALCOHOL(ETHYL),SERUM <4 <10 mg/dL   SARS-COV-2    Collection Time: 01/11/21  3:30 PM   Result Value Ref Range    SARS-CoV-2 Please find results under separate order     URINALYSIS W/ RFLX MICROSCOPIC    Collection Time: 01/11/21  4:35 PM   Result Value Ref Range    Color Yellow/Straw      Appearance Clear Clear      Specific gravity 1.029 1.003 - 1.030      pH (UA) 5.0 5.0 - 8.0      Protein Negative Negative mg/dL    Glucose Negative Negative mg/dL    Ketone Negative Negative mg/dL    Bilirubin Negative Negative      Blood Negative Negative      Urobilinogen 0.1 0.1 - 1.0 EU/dL    Nitrites Negative Negative      Leukocyte Esterase Negative Negative      WBC 0-4 0 - 4 /hpf    RBC 0-5 0 - 5 /hpf    Bacteria Negative Negative /hpf    Mucus Trace /lpf   DRUG SCREEN, URINE    Collection Time: 01/11/21  4:35 PM   Result Value Ref Range    AMPHETAMINES Positive (A) Negative      BARBITURATES Negative Negative      BENZODIAZEPINES Positive (A) Negative      COCAINE Negative Negative      METHADONE Negative Negative      OPIATES Negative Negative      PCP(PHENCYCLIDINE) Negative Negative      THC (TH-CANNABINOL) Positive (A) Negative      Drug screen comment        This test is a screen for drugs of abuse in a medical setting only (i.e., they are unconfirmed results and as such must not be used for non-medical purposes, e.g.,employment testing, legal testing). Due to its inherent nature, false positive (FP) and false negative (FN) results may be obtained. Therefore, if necessary for medical care, recommend confirmation of positive findings by GC/MS. Radiologic Studies -   [unfilled]  CT Results  (Last 48 hours)    None        CXR Results  (Last 48 hours)    None          Medical Decision Making and ED Course   I am the first provider for this patient.     I reviewed the vital signs, available nursing notes, past medical history, past surgical history, family history and social history. Vital Signs-Reviewed the patient's vital signs. Patient Vitals for the past 12 hrs:   Temp Pulse Resp BP SpO2   01/11/21 1628 98.7 °F (37.1 °C) 86 16 134/83 (!) 80 %   01/11/21 1532     100 %   01/11/21 1300 98.3 °F (36.8 °C) (!) 117 16 (!) 147/87 100 %         Records Reviewed: Nursing Notes and Old Medical Records    The patient presents with anxiety, depression, with a differential diagnosis of Syeda reaction, bipolar disorder, substance use disorder,      Provider Notes (Medical Decision Making):     MDM   71-year-old male, past medical history of anxiety, bipolar disorder, presents to the emergency department with [employer] after he became very angry at home. Patient denies any suicidal or homicidal ideation, denies any active hallucinations. Patient anxious appearing, pacing, no medical complaints at this time, vitals show tachycardia at triage, normal rate on my examination. Normal physical exam    Will draw basic lab work, tox screen, behavioral health to evaluate patient. ED Course:   Initial assessment performed. The patients presenting problems have been discussed, and they are in agreement with the care plan formulated and outlined with them. I have encouraged them to ask questions as they arise throughout their visit. ED Course as of Jan 11 2225   Mon Jan 11, 2021   1743 And medically cleared, currently bed search is underway patient voluntary at this point. [PZ]   2210 Covid test remains pending. Otherwise medically cleared. [PZ]      ED Course User Index  [PZ] Bolivar John MD         Procedures       Luc Greenberg MD  Procedures               Disposition     Admit to behavioral health          Diagnosis     Clinical Impression:   1. Bipolar affective disorder, remission status unspecified ([facility] [address]    2.  Suicidal ideation Attestations:    Tomas Fine MD    Please note that this dictation was completed with [employer], the computer voice recognition software. Quite often unanticipated grammatical, syntax, homophones, and other interpretive errors are inadvertently transcribed by the computer software. Please disregard these errors. Please excuse any errors that have escaped final proofreading. Thank you.

## 2021-01-11 NOTE — PROGRESS NOTES
Spiritual Care Assessment/Progress Note  [facility]      NAME: Nelly Hughes III      MRN: [id number]  AGE: 35 y.o.  SEX: male  Uatsdin Affiliation: Yazidi   Language: English     1/11/2021     Total Time (in minutes): 15     Spiritual Assessment begun in [address] [place] [address] DEPT through conversation with:         [x]Patient        [] Family    [] Friend(s)        Reason for Consult: Emergency Department visit     Spiritual beliefs: (Please include comment if needed)     [] Identifies with a ani tradition:         [] Supported by a ani community:            [] Claims no spiritual orientation:           [] Seeking spiritual identity:                [x] Adheres to an individual form of spirituality:           [] Not able to assess:                           Identified resources for coping:      [x] Prayer                               [] Music                  [x] Guided Imagery     [] Family/friends                 [] Pet visits     [] Devotional reading                         [] Unknown     [] Other:                                               Interventions offered during this visit: (See comments for more details)    Patient Interventions: Affirmation of emotions/emotional suffering, Affirmation of ani, Coping skills reviewed/reinforced, Guidance concerning next steps/process to be expected, Iconic (affirming the presence of God/Higher Power), Initial/Spiritual assessment, patient floor           Plan of Care:     [] Support spiritual and/or cultural needs    [] Support AMD and/or advance care planning process      [] Support grieving process   [] Coordinate Rites and/or Rituals    [] Coordination with community clergy   [] No spiritual needs identified at this time   [] Detailed Plan of Care below (See Comments)  [] Make referral to Music Therapy  [] Make referral to Pet Therapy     [] Make referral to Addiction services  [] Make referral to [facility]  [] Make referral to Spiritual Care Partner  [] No future visits requested        [x] Follow up upon further referrals     Comments: The purpose of the visit was to do a spiritual assessment on the patient. The patient expressed being bothered about taking a covid-19 swab test. He shared that he feels forced to take the test. He mentioned he would rather be in [facility] than take the test. He shared that he has relationship with God, and that God strengthens him. He expressed feeling comforted by the visit from the Maggie Hines, and asked if the  would pray. He expressed believing that prayer helps him deal with his life. The  listened empathically and reflectively. At the patient's request the  offered prayer. [facility] Barbara Rios.    can be reached by calling the  at [facility]  [phone]

## 2021-01-11 NOTE — BSMART NOTE
BH Intake Pt assessed face to face in ED. Pt brought in voluntary w/ [place]  who dropped Pt off for SI. Pt presents disheveled, but was cooperative, guard. Pt was A&Ox4. Pt's speech was fast, got off topic occasionally. Pt's thought process was evasive but focused. Pt denies SI, HI. Pt presents w/ little insight and poor judgment. Pt reports he's here because of \"liars\" and that someone had stolen his klonopin today and then he \"acted a fool\". Writer inquired how Pt acted a cool but Pt was evasive but denies acting out, denies threatening himself for others. Pt reports he has an appt tomorrow w/ a new psychiatrist in [place] but couldn't remember the doctors name or place. Pt reports his dad called 47 today after he realized the Pt's prescriptions were \"stolen\" and Pt said because his dad knows how he gets when he's off his meds. Pt endorses his \"lows are low\". Pt reports his mom passed away in 2017 and has beem struggling since and he lives w/ he elderly father. Pt reports he was upset after realizing his klonopin was taken but knows that \"fighting is not an option\". Pt denies SI, HI. Pt reports he is \"always\" depressed. Pt denies AVH. Pt report he sleep and never really bene great. Pt denies paranoia. Pt presents w/ racing thoughts, easily distracted. Pt endorses anxiety and reports he is \"always stressing what's right or wrong\". Pt reports hx of anxiety and agoraphobia. Pt reports he never leaves the house. Pt denies access to weapons. Pt reports smoking marijuana \"once in awhile. Pt reports his last IP [address] admission was in 2013. Pt reports he thinks all his family has mental illness. Pt denies legal issues. Pt reports no support system. Pt reports to live w/ his dad. Writer spoke w/ Pt's dad, Sherman Brought [phone]. Dad reports pt has been \"this way for 8 days\" and reports Pt has threatened to kill himself multiple times over the past week and has put a gun to his head and put a rope around his neck leaving marks. Dad reports he found the Pt's empty pill bottle, unsure if meds were stolen or Pt has been abusing prescription. Dad reports Pt has been getting mad w/ him lately and he doesn't feel safe around his son. Dad said he believe Pt is in need of IP tx and said pt can be \"slick\" and deceiving. Writer spoke w/ Katie Pretty who recommends IP [address] admission due to Pt's dad endorsing Pt has had two recent suicide attempts this past week. Pt agreeable to stay vol at this time. Pt accepted to [facility] pending medical clearance.

## 2021-01-12 PROBLEM — R45.851 SUICIDAL IDEATION: Status: ACTIVE | Noted: 2021-01-12

## 2021-01-12 PROCEDURE — 65220000003 HC RM SEMIPRIVATE PSYCH

## 2021-01-12 PROCEDURE — 74011250637 HC RX REV CODE- 250/637: Performed by: PSYCHIATRY & NEUROLOGY

## 2021-01-12 RX ORDER — HYDROXYZINE 50 MG/1
50 TABLET, FILM COATED ORAL
Status: DISCONTINUED | OUTPATIENT
Start: 2021-01-12 | End: 2021-01-18 | Stop reason: HOSPADM

## 2021-01-12 RX ORDER — OXCARBAZEPINE 150 MG/1
150 TABLET, FILM COATED ORAL 2 TIMES DAILY
Status: CANCELLED | OUTPATIENT
Start: 2021-01-13

## 2021-01-12 RX ORDER — ACETAMINOPHEN 325 MG/1
650 TABLET ORAL
Status: DISCONTINUED | OUTPATIENT
Start: 2021-01-12 | End: 2021-01-18 | Stop reason: HOSPADM

## 2021-01-12 RX ORDER — MAG HYDROX/ALUMINUM HYD/SIMETH 200-200-20
30 SUSPENSION, ORAL (FINAL DOSE FORM) ORAL
Status: DISCONTINUED | OUTPATIENT
Start: 2021-01-12 | End: 2021-01-18 | Stop reason: HOSPADM

## 2021-01-12 RX ORDER — ADHESIVE BANDAGE
30 BANDAGE TOPICAL DAILY PRN
Status: CANCELLED | OUTPATIENT
Start: 2021-01-12

## 2021-01-12 RX ORDER — ADHESIVE BANDAGE
30 BANDAGE TOPICAL DAILY PRN
Status: DISCONTINUED | OUTPATIENT
Start: 2021-01-12 | End: 2021-01-18 | Stop reason: HOSPADM

## 2021-01-12 RX ORDER — ACETAMINOPHEN 325 MG/1
650 TABLET ORAL
Status: CANCELLED | OUTPATIENT
Start: 2021-01-12

## 2021-01-12 RX ORDER — TRAZODONE HYDROCHLORIDE 50 MG/1
50 TABLET ORAL
Status: DISCONTINUED | OUTPATIENT
Start: 2021-01-12 | End: 2021-01-18 | Stop reason: HOSPADM

## 2021-01-12 RX ORDER — QUETIAPINE FUMARATE 25 MG/1
25 TABLET, FILM COATED ORAL 3 TIMES DAILY
Status: CANCELLED | OUTPATIENT
Start: 2021-01-13

## 2021-01-12 RX ORDER — TRAZODONE HYDROCHLORIDE 50 MG/1
50 TABLET ORAL
Status: CANCELLED | OUTPATIENT
Start: 2021-01-12

## 2021-01-12 RX ORDER — HYDROXYZINE 50 MG/1
50 TABLET, FILM COATED ORAL
Status: CANCELLED | OUTPATIENT
Start: 2021-01-12

## 2021-01-12 RX ORDER — ZIPRASIDONE HYDROCHLORIDE 20 MG/1
20 CAPSULE ORAL 2 TIMES DAILY WITH MEALS
Status: DISCONTINUED | OUTPATIENT
Start: 2021-01-12 | End: 2021-01-15 | Stop reason: ALTCHOICE

## 2021-01-12 RX ORDER — DIPHENHYDRAMINE HYDROCHLORIDE 50 MG/ML
50 INJECTION, SOLUTION INTRAMUSCULAR; INTRAVENOUS
Status: CANCELLED | OUTPATIENT
Start: 2021-01-12

## 2021-01-12 RX ORDER — BENZTROPINE MESYLATE 1 MG/1
1 TABLET ORAL
Status: CANCELLED | OUTPATIENT
Start: 2021-01-12

## 2021-01-12 RX ORDER — LORAZEPAM 1 MG/1
1 TABLET ORAL
Status: ACTIVE | OUTPATIENT
Start: 2021-01-12 | End: 2021-01-13

## 2021-01-12 RX ORDER — LORAZEPAM 2 MG/ML
1 INJECTION INTRAMUSCULAR
Status: CANCELLED | OUTPATIENT
Start: 2021-01-12

## 2021-01-12 RX ORDER — HALOPERIDOL 5 MG/ML
5 INJECTION INTRAMUSCULAR
Status: CANCELLED | OUTPATIENT
Start: 2021-01-12

## 2021-01-12 RX ORDER — OLANZAPINE 5 MG/1
5 TABLET ORAL
Status: CANCELLED | OUTPATIENT
Start: 2021-01-12

## 2021-01-12 RX ADMIN — TRAZODONE HYDROCHLORIDE 50 MG: 50 TABLET ORAL at 21:31

## 2021-01-12 NOTE — CONSULTS
Kyree VENEGAS, YARITZAP-C    History and Physical      Patient: Rika Carranza MRN: [id number]  SSN: [id number]    YOB: 1990  Age: 35 y.o. Sex: male        Chief Complaint   Patient presents with    Mental Health Problem       Subjective: Master Chaduhry III is a 35 y.o.  male who presented to the ED with complaint of feeling angry. He states that his father called the police department to have him committed. Patient denies suicidal and homicidal ideation. Reports the only reason he's admitted to inpatient is because he was given the choice to be admitted to the psych unit or be taken to detention. Patient is currently under a TDO. PMH significant for depression, bipolar d/o, psychotic d/o, and drug usage. Hospitalist team consulted for h&p. Past Medical History:   Diagnosis Date    Acid reflux     Allergic rhinitis     Anxiety     Bipolar     Depression     Drug overdose     Heroin    Psychotic disorder (HCC)      Past Surgical History:   Procedure Laterality Date    HX ORTHOPAEDIC      Right elbow    HX ORTHOPAEDIC      L hand      Family History   Problem Relation Age of Onset    Heart Disease Mother     Diabetes Maternal Uncle      Social History     Tobacco Use    Smoking status: Current Every Day Smoker     Packs/day: 1.00    Smokeless tobacco: Former User   Substance Use Topics    Alcohol use: No      Prior to Admission medications    Medication Sig Start Date End Date Taking? Authorizing Provider   clonazePAM (KlonoPIN) 0.5 mg tablet Take 1 Tab by mouth two (2) times daily as needed for Anxiety for up to 30 days. Max Daily Amount: 1 mg. 1/7/21 2/6/21  Avis Jeffrey MD        Allergies   Allergen Reactions    Sulfa (Sulfonamide Antibiotics) Unknown (comments)     States has always been told allergic but does not recall any specific reaction       Review of Systems:  Review of Systems   Constitutional: Negative. HENT: Negative. Eyes: Negative. Respiratory: Negative. Cardiovascular: Negative. Gastrointestinal: Negative. Genitourinary: Negative. Musculoskeletal: Negative. Skin: Negative. Neurological: Negative. Endo/Heme/Allergies: Negative. Psychiatric/Behavioral: Negative. Objective:     Vitals:    01/11/21 1532 01/11/21 1628 01/12/21 0111 01/12/21 0946   BP:  134/83 132/78 107/75   Pulse:  86 86 94   Resp:  16 18 18   Temp:  98.7 °F (37.1 °C) 97.8 °F (36.6 °C) 97.4 °F (36.3 °C)   SpO2: 100% (!) 80%     Weight:       Height:            Physical Exam:  Physical Exam  Vitals signs and nursing note reviewed. Constitutional:       Appearance: Normal appearance. HENT:      Head: Normocephalic. Nose: Nose normal.      Mouth/Throat:      Mouth: Mucous membranes are moist.   Eyes:      Extraocular Movements: Extraocular movements intact. Neck:      Musculoskeletal: Normal range of motion. Cardiovascular:      Rate and Rhythm: Normal rate and regular rhythm. Pulses: Normal pulses. Heart sounds: Normal heart sounds. Pulmonary:      Effort: Pulmonary effort is normal.      Breath sounds: Normal breath sounds. Abdominal:      General: Bowel sounds are normal.      Palpations: Abdomen is soft. Musculoskeletal: Normal range of motion. Skin:     General: Skin is warm and dry. Capillary Refill: Capillary refill takes less than 2 seconds. Neurological:      Mental Status: He is alert and oriented to person, place, and time.    Psychiatric:      Comments: Agitated, yelling          Assessment:     Bipolar Disorder  Psychotic Disorder  Tobacco dependency  Substance abuse    Plan:     Defer behavioral health disorders to psych  Patient counseled on smoking cessation  Patient counseled on substance abuse  PRN ativan x 1 dose      Full Code  GI PROPHYLAXIS  DVT PROPHYLAXIS  Home medications reviewed and reconciled    Above treatment plan reviewed and discussed with patient in detail at bedside, all questions answered. Patient medically stable for treatment on [facility] unit. Medical team will sign off.     Signed By: Roxanne Crockett NP     January 12, 2021

## 2021-01-12 NOTE — GROUP NOTE
IP  GROUP DOCUMENTATION INDIVIDUAL Group Therapy Note Date: 1/12/2021 Group Start Time: 4828 Group End Time: 1500 Group Topic: Special Track - Drug Topic [facility] 2  NON ACUTE [facility] GROUP DOCUMENTATION GROUP Group Therapy Note Attendees: 4 out of 11 
 
2pm Substance Use/Abuse Group Patients were taught about the concept of Protracted Withdrawal via the use of a handout and discussion. During this discussion, the patient's were encouraged to openly discuss their thoughts, feelings, and emotions and any experiences that they may have already had with the symptoms of Protracted Withdrawal. Lastly, patients were encouraged to listen to and be supportive of their peers. Attendance: Did not attend Patient's Goal:  N/A Interventions/techniques: Other N/A Follows Directions: Other N/A Interactions: Other N/A Mental Status: Other N/A Behavior/appearance: N/A Goals Achieved: N/A Additional Notes:  Patient did not attend group despite having been encouraged to do so. [place]

## 2021-01-12 NOTE — BH NOTES
Pt assessed by Lia Roberson, reports pt is willing to stay voluntary for treatment. Reports if pt should change mind, TDO will be sought.

## 2021-01-12 NOTE — ED NOTES
Nurse writing called lab inquiring about covid result. Says completed at  by lab. Called and spoke to  who said they did not have a sample in lab for him at 1390.    7658 called lab again and spoke with Leonard Perez. He said he found the sample, but it was in the wrong bag and swab.     Lab will run covid test as a rapid (30 min) to allow patient to be admitted upstairs this evening. (psych)

## 2021-01-12 NOTE — GROUP NOTE
[facility] GROUP DOCUMENTATION INDIVIDUAL Group Therapy Note Date: 1/12/2021 Group Start Time: 1300 Group End Time: 5417 Group Topic: Process Group - Inpatient [facility] Caridad Sessions R 
 
[facility] GROUP DOCUMENTATION GROUP Group Therapy Note Attendees: Patients encouraged to discuss their current situations, what has been on their mind and bothering them, how they came to the hospital.  Patients encouraged to give peer support and feedback, themes surrounding irritation with being in the hospital, self care, and politics came up and were discussed. All mentions of politics were attempted to be redirected by writer, however political discussions continued to come up. Attendance: Attended Patient's Goal:  Attends activities and groups Interventions/techniques: Validated, Promoted peer support, Reinforced and Supported Follows Directions: Followed directions Interactions: Disorganized interaction Mental Status: Anxious, Elevated and Labile Behavior/appearance: Agitated, Attentive, Negative, Pressured speech and Resistive/oppositional 
 
Goals Achieved: Able to engage in interactions, Able to listen to others, Able to give feedback to another, Able to reflect/comment on own behavior, Able to manage/cope with feelings, Able to receive feedback, Able to experience relief/decrease in symptoms, Able to self-disclose, Discussed coping, Discussed discharge plans, Discussed self-esteem issues, Identified feelings and Identified triggers Additional Notes:  Pt attended group and was engaged. Pt shared about how he feels that he was wrongly accepted to the unit, shared that he feels that he has been to the hospital for help before and knows that this time he does not need to be here. Pt somewhat bizarre, very focused on political situation in [place]. Pt believes Candy is a fake virus, shred that he feels COVID tests are an excuse for the government to put a sensor in your nose to track you. Katherin Baig

## 2021-01-12 NOTE — ED NOTES
Pt aggitated after waking him from sleep to go to his room, psych floor, pt verbally abusive, security called to assist pt to the floor

## 2021-01-12 NOTE — PROGRESS NOTES
[address] Medicine Residency Attending Addendum:  Dr. Marko Chin MD,  the patient and I were not physically present during this encounter. The resident and I are concurrently monitoring the patient care through appropriate telecommunication technology. I discussed the findings, assessment and plan with the resident and agree with the resident's findings and plan as documented in the resident's note.       Kathy Cantor MD

## 2021-01-12 NOTE — PROGRESS NOTES
Patient is a 35year old  male admitted to [address] [place] [address] under the care of dr. Karissa Daniels with a diagnosis of suicidal ideation. Patient was brought into the emergency room by [place] [id number] police. His father called ather an argument with the patient about his klonopin being stolen. Father states the patient has been increasingly paranoid and and aggressive over the past 8 days. Father states he has become afraid of patient and what he might do. Patient has disorganized thoughts and has multiple reasons why he was brought here. He states he came to the hospital  because he knows how he gets and  doesn't want to fight. Patient received ativan in the ED x2 for increased agitation and aggression. He is disheveled and uncooperative. Patient is able to be redirected but is argumentative and aggressive. Patient refused to sign paper work stating he is pissed off and that this is some bull shit waking him up at midnight to move up here. Patient reluctant to give boots to staff but eventually gave them  to staff stating staff is racist and some picky mother fuckers. Patient has a constricted affect and irritable mood. He has been non compliant with medications and may have been abusing his klonipin per his father. Patient has a past psychiatric history of bipolar. He denies any past medical history.

## 2021-01-12 NOTE — BH NOTES
Client presents a flat affect and depressed mood. He has a good appetite and reports sleeping well. Denies the need to be here and requesting to be discharged when he sees the doctor. Client opted not to take morning meds. He is hyperverbal and doesnot like redirection. Remains on close observation for safety.

## 2021-01-13 PROCEDURE — 65220000003 HC RM SEMIPRIVATE PSYCH

## 2021-01-13 PROCEDURE — 74011250637 HC RX REV CODE- 250/637: Performed by: PSYCHIATRY & NEUROLOGY

## 2021-01-13 RX ADMIN — ZIPRASIDONE HYDROCHLORIDE 20 MG: 20 CAPSULE ORAL at 08:19

## 2021-01-13 NOTE — GROUP NOTE
[facility]  GROUP DOCUMENTATION INDIVIDUAL Group Therapy Note Date: 1/13/2021 Group Start Time: 1236 Group End Time: 7827 Group Topic: Nursing SRM 2 BEHA TH ACUTE HOMER Mullen  GROUP DOCUMENTATION GROUP Group Therapy Note Attendees:3 pts. Attendance:  
 
Patient's Goal: Interventions/techniques: Follows Directions:  
 
Interactions:  
 
Mental Status:  
 
Behavior/appearance:  
 
Goals Achieved:   
 
Additional Notes:   
 
Justice Mancia LPN
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
## 2021-01-13 NOTE — PROGRESS NOTES
Problem: Suicide  Goal: *STG: Remains safe in hospital  Outcome: Progressing Towards Goal  Goal: *STG: Seeks staff when feelings of self harm or harm towards others arise  Outcome: Progressing Towards Goal  Goal: *STG: Attends activities and groups  Outcome: Progressing Towards Goal  Goal: *STG/LTG: Complies with medication therapy  Outcome: Progressing Towards Goal     Problem: Falls - Risk of  Goal: *Absence of Falls  Description: Document Aneudy Fall Risk and appropriate interventions in the flowsheet.   Outcome: Progressing Towards Goal  Note: Fall Risk Interventions:

## 2021-01-13 NOTE — BH NOTES
FIREARM ASSESSMENT    Pt does not have access to a gun any longer. Writer called and spoke with pt's father Joshua Chowdhury In regards to reports that the pt had put a gun to his head. Pt's father stated that after he discovered that his son had done this, he snuck the gun out of the home when the pt was out so as to ensure that the pt could not harm himself. Therefore, when the pt discharges home the weapon will not be present and the pt will be unable to access it.

## 2021-01-13 NOTE — BH NOTES
SUBSTANCE USE/ABUSE NOTE     Pt reports that he has done \"everything\" in regards to drugs, but states that he has slowed down his use since about the age of 27. Pt endorsed regular marijuana usage. Pt's toxicology report was positive for amphetamines, benzodiazepines (which he may or may not be actually prescribed), and THC.

## 2021-01-13 NOTE — BH NOTES
Pt. Is up and visible on unit, affect is flat, appetite good,appearance is neat, denies feeling suicidal, blames others for his hospitalization, insight is limited, attends groups but no active participation,feels like he doesn't belong here. no other concerns voiced,remains on close observation.

## 2021-01-14 PROCEDURE — 74011250637 HC RX REV CODE- 250/637: Performed by: PSYCHIATRY & NEUROLOGY

## 2021-01-14 PROCEDURE — 65220000003 HC RM SEMIPRIVATE PSYCH

## 2021-01-14 RX ORDER — IBUPROFEN 200 MG
1 TABLET ORAL DAILY
Status: DISCONTINUED | OUTPATIENT
Start: 2021-01-15 | End: 2021-01-14

## 2021-01-14 RX ORDER — IBUPROFEN 200 MG
1 TABLET ORAL DAILY
Status: DISCONTINUED | OUTPATIENT
Start: 2021-01-14 | End: 2021-01-18 | Stop reason: HOSPADM

## 2021-01-14 RX ADMIN — ZIPRASIDONE HYDROCHLORIDE 20 MG: 20 CAPSULE ORAL at 17:15

## 2021-01-14 RX ADMIN — ZIPRASIDONE HYDROCHLORIDE 20 MG: 20 CAPSULE ORAL at 09:04

## 2021-01-14 NOTE — BH NOTES
THELMA pushing for discharge yesterday unit and saw the patient at which point patient chose to stay voluntarily no agitation no aggression but remains rather passive-aggressive today when I tried to see him he was in bed withdrawn sleeping would not wake up even though I called several times later he went to the group again in the group also after initial conversation he was sleeping in spite of rest keeping in he is on the low-dose Seroquel 25 mg 3 times a day he says he was taking a milligram outside.   We will reassess the case tomorrow including the Seroquel dose it appears to be more he was voluntarily chose not to wake up but again we can adjust the dosage if she is still drowsy tomorrow to look at how he does function  4 hours atient case discussed this morning patient was

## 2021-01-14 NOTE — GROUP NOTE
IP  GROUP DOCUMENTATION INDIVIDUAL Group Therapy Note Date: 1/14/2021 Group Start Time: 4667 Group End Time: 1774 Group Topic: Recreational/Music Therapy SRM 2 BH NON ACUTE Eric Repress 
 
IP  GROUP DOCUMENTATION GROUP Group Therapy Note Facilitated leisure skills group to reinforce positive coping through music, art tasks, social interaction and group activities Attendees: 9 out of 11 Attendance: Attended Patient's Goal:  *STG: Attends activities and groups Interventions/techniques: Art integration and Supported Follows Directions: Followed directions Interactions: Interacted appropriately Mental Status: Calm Behavior/appearance: Cooperative Goals Achieved: Able to engage in interactions and Able to listen to others Additional Notes:  Receptive to listening to music and a song he selected. Declined to work on leisure task Juan Andino

## 2021-01-14 NOTE — BH NOTES
Behavioral Health Treatment Team Note     Patient goal(s) for today: Pt reports goal for today is to go home as soon as possible. Treatment team focus/goals: Continue to provide treatment, gain collateral    Progress note: Pt presents as irritable, mood fluctuating rapidly, tearful at times, pressured speech and irritable at times, reporting that there is nothing wrong and that he needs to go home. Pt reports anxiety, irritability, reports needing a cigarette. Writer asked if pt has a nicotine patch and pt reported that he did not, writer and patient spoke with nursing staff together and pt was provided with a patch. Pt reports that he believes his father was lying about pt trying to kill self so that father coud get extra money on his social security. Pt reports that his father and sister are out to get him. Pt tearful and grabbed writer for a hug while in hallway. Writer gave pt hug back but asked that he not do that again in attempt to create boundary. Pt later grabbed writer for a hug again. Pt wants to know when he can go home, writer informed pt typical stay is between 5 and 7 days. Pt reports that he thinks he can make it a few more days. Pt thanked writer for speaking with him. LOS:  2  Expected LOS: 5-7 days    Insurance info/prescription coverage:  2001 [employer]  Date of last family contact:  1/14/2020  Family requesting physician contact today:  no  Discharge plan:  Discharge home with outpatient resources. Guns in the home:  no   Outpatient provider(s):  None at this time    Participating treatment team members:  Abel Banks III, * (assigned SW), Janell Patten LMSW

## 2021-01-14 NOTE — BH NOTES
Dx: Depression w/SI    Affect full. When writer went to pt's room to give him his 0900 scheduled meds, he immediately started to ventilate. Juandulce Rodriguezf he was ready to go home. \"My father called the police and put me In here because he wants me to start getting a disability check. He thinks this is the way to get it started but he could have taken me to  to do that. I don't think I trust him any more. 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 \" Per report, pt was prescreened, because he put a gun to his head, and a rope around his neck; however, pt did not divulge this info. Compliant with scheduled med. Said the med made him sleep, \"all day long\" yesterday. Up for bfkt; consumed 3/4 and returned to bed. Appearance disheveled; said he will attend to hygiene, later today. Encouraged to attend groups. Q 15 mins checks maintained, for safety. Yessi 0800 said after talking w/pt she felt he could use something, for anxiety. Pt was offered Vistaril; however, he refused it. Refused to attend groups, although encouraged.

## 2021-01-14 NOTE — BH NOTES
Patient has been up and visible on the unit. His affect is flat. He denies SI, HI, AH, VH. No acute distress noted. He remains on close observation for safety. No acute distress noted.

## 2021-01-14 NOTE — GROUP NOTE
[facility] GROUP DOCUMENTATION INDIVIDUAL Group Therapy Note Date: 1/14/2021 Group Start Time: 1300 Group End Time: 4164 Group Topic: Process Group - Inpatient [facility] 2  NON ACUTE [facility] GROUP DOCUMENTATION GROUP Group Therapy Note Attendees: 9 out of 13 
 
1pm Process Group Patients were encouraged to discuss their thoughts, feelings, and emotions. Today's group topic focused on what the patients' personal goals are for today and the ice breaker question: \"What are the components that make you who you are?\" Lastly, the patients were encouraged to listen to and be supportive of their peers. Attendance: Did not attend Patient's Goal:  N/A Interventions/techniques: Other N/A Follows Directions: Other N/A Interactions: Other N/A Mental Status: Other N/A Behavior/appearance: N/A Goals Achieved: N/A Additional Notes:  Pt did not attend group despite having been encouraged to do so. [place]

## 2021-01-14 NOTE — BH NOTES
SRM Recreational Therapy Assessment    Orientation:  Person, Place, Date and Situation    Reason for Admission:  Pt reported \" he shouldn't be here\" Pt reported \"it was senior living or here\" Pt reported \"his dad told the police he needed help\"  Pt reported\" it's nothing wrong with him and he think his dad wants him to get disability check or something\"  Pt denied being depressed, having suicidal thoughts and having hallucinations.  According to chart pt's dad reported pt has threatened to kill himself multiple times over the past week and has put a gun to his  head and a rope around his neck leaving marks    Medical Precautions / Conditions:  HTN per patient    Impairments:     Vision:  Wears Glasses Yes      Wears Contacts Yes      Are they with Patient Yes     Hearing Aids No     Utilization of Ambulatory Devices:  None    Health Problems Preventing Participation in Activities:  No  How:  N/a    Leisure Interest Checklist:  Cards / Board Games, Fishing, Hunting, Listening to Music, Playing an Instrument, Reading, Sports and Walking    Does patient participate in leisure activities:  Yes    Setting:  Alone and With Friends    Other Activities / Skills / Talents:  Play the guitar    Do emotions interfere with leisure activities / lifestyles:  Sometimes    When engaging in leisure activities, do you forget worries:  Yes    Do you belong to a Confucianist:  No    Are you active in [employer] activities:  No    Typical Day:  Pt reported \"when he is not working he play his guitar, spend time with friends, doing social activities and jamming\"    Strengths:  Pt reported \"girlfriend support and I love to helping others\"    Limitations / Barriers:  Family Support, Anxiety and Sleep    Treatment Modalities:  Stress Management, Coping Skills, Symptom Recognition, Healthy Thinking, Mood Management and Leisure Skills    Patient Educational  Needs:  Skills to recognize and challenge problematic thinking, Identify positive coping strategies and skills to manage symptoms or moods, Leisure education and Recognition of symptoms and signs    Focus of Treatment:  Introduce positive outlets for self expression and Introduce and encourage the exploration of alternative coping skills    Summary:  Pt was cooperative during assessment. Pt reported he stay with his dad. Pt reported he is only here because his dad called the police and told them he need help. 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 Pt reported he has a PCP.

## 2021-01-15 PROCEDURE — 74011250636 HC RX REV CODE- 250/636

## 2021-01-15 PROCEDURE — 65220000003 HC RM SEMIPRIVATE PSYCH

## 2021-01-15 PROCEDURE — 74011250637 HC RX REV CODE- 250/637: Performed by: PSYCHIATRY & NEUROLOGY

## 2021-01-15 RX ORDER — BENZTROPINE MESYLATE 1 MG/ML
INJECTION INTRAMUSCULAR; INTRAVENOUS
Status: COMPLETED
Start: 2021-01-15 | End: 2021-01-15

## 2021-01-15 RX ORDER — BENZTROPINE MESYLATE 1 MG/1
0.5 TABLET ORAL 2 TIMES DAILY
Status: DISCONTINUED | OUTPATIENT
Start: 2021-01-16 | End: 2021-01-18 | Stop reason: HOSPADM

## 2021-01-15 RX ORDER — OLANZAPINE 5 MG/1
5 TABLET ORAL 2 TIMES DAILY
Status: DISCONTINUED | OUTPATIENT
Start: 2021-01-15 | End: 2021-01-18 | Stop reason: HOSPADM

## 2021-01-15 RX ORDER — BENZTROPINE MESYLATE 1 MG/ML
1 INJECTION INTRAMUSCULAR; INTRAVENOUS ONCE
Status: COMPLETED | OUTPATIENT
Start: 2021-01-15 | End: 2021-01-15

## 2021-01-15 RX ADMIN — HYDROXYZINE HYDROCHLORIDE 50 MG: 50 TABLET, FILM COATED ORAL at 08:57

## 2021-01-15 RX ADMIN — BENZTROPINE MESYLATE 1 MG: 1 INJECTION INTRAMUSCULAR; INTRAVENOUS at 14:00

## 2021-01-15 RX ADMIN — BENZTROPINE MESYLATE 1 MG: 1 INJECTION, SOLUTION INTRAMUSCULAR; INTRAVENOUS at 14:00

## 2021-01-15 RX ADMIN — HYDROXYZINE HYDROCHLORIDE 50 MG: 50 TABLET, FILM COATED ORAL at 13:18

## 2021-01-15 RX ADMIN — ZIPRASIDONE HYDROCHLORIDE 20 MG: 20 CAPSULE ORAL at 08:57

## 2021-01-15 NOTE — GROUP NOTE
[facility]  GROUP DOCUMENTATION INDIVIDUAL Group Therapy Note Date: 1/15/2021 Group Start Time: 1400 Group End Time: 1500 Group Topic: AA/NA 
 
[facility] Priya Horse IP  GROUP DOCUMENTATION GROUP Group Therapy Note The therapist facilitated a group by educating the participants about addiction/addictive thinking and then going over a worksheet about denial together. Attendees: 9 Attendance: Did not attend Patient's Goal: Interventions/techniques: Follows Directions:  
 
Interactions:  
 
Mental Status:  
 
Behavior/appearance:  
 
Goals Achieved: Additional Notes: The patient was invited but [facility]

## 2021-01-15 NOTE — GROUP NOTE
[facility]  GROUP DOCUMENTATION INDIVIDUAL Group Therapy Note Date: 1/15/2021 Group Start Time: 5757 Group End Time: 1228 Group Topic: [employer] SRM 2 BEHA TH ACUTE Leeen Peer 
 
IP  GROUP DOCUMENTATION GROUP Group Therapy Note Attendees:  
  
 
Attendance: Did not attend Patient's Goal: Interventions/techniques: Follows Directions:  
 
Interactions:  
 
Mental Status:  
 
Behavior/appearance:  
 
Goals Achieved:   
 
 
Additional Notes:   
 
Sami Zhang

## 2021-01-15 NOTE — BH NOTES
Nursing Note    Patient is alert and oriented x 3. He denies any SI/HI/AH/VH. Patient denies any anxiety or depression. Restricted affect and isolated mood. Patient remained in his room for the majority of the shift. Staff will continue to monitor patient for safety.

## 2021-01-15 NOTE — BH NOTES
Collateral:    Writer spoke with pt's father who reports that he is very concerned for patient. Father reports that his daughter just told him about another SI gesture pt made while at home. Per sister, she, her partner, and pt had been pout in the yard when pt grabbed a chainsaw and turned it on, was swinging it around stating that he would use it to kill himself. Father reports that has also been increasingly paranoid at home. He reports that pt had been so paranoid that people were out to get him that he was crawling around house to avoid being seen through windows and will speak in codes to father to avoid anyone understanding what he is saying because he believes he is being listened to. Father reports that pt has been calling him and yelling at him, demanding to go home, demanding that father do something to get pt out of hospital.  Per father, pt had asked his father to lie to D19 about what was going on earlier in the week when he was assessed [facility]. Father became tearful when discussing his son, appears to really want pt to get help. Per father, pt has had 4-5 heroin OD's over the past 4 years, reports that pt has stopped using heroin but is still abusing drugs. Apparently, pt has also lost 10 friends over the past year due to overdose and most recently his best friend to [facility] about a week and a half ago. Father also reports pt has hx of one prior SI attempt at age 16. Per father, pt will say things like \"he [pt's best friend] killed himself, I can too. \"  Per father pt is constantly making suicide threats in different ways. Father very concerned, wants to make sure pt is getting the treatment he needs.

## 2021-01-15 NOTE — BH NOTES
Behavioral Health Treatment Team Note     Patient goal(s) for today: Pt reports goal for today is to leave the hospital as soon as possible. Treatment team focus/goals: Continue to provide treatment    Progress note: Pt presents laying in th bed, blanket covering head, mumbled speech, poor eye contact, soft spoken, slightly irritated. Pt reports that he had an adverse reaction to his medication of his tongue welling, complaining that nurses gave a shot but he was unsure of what shot was. Writer checked with nursing staff, pt was given cogentin to mediate the side effects pt was having. Pt informed of this, thanked writer for checking. Pt reported not feeling very well today, reports that he needs to go home and that he needs rest.  Pt encouraged to continue resting today and try to feel better. Writer inquired if she could get ASMITA signed for pt's girlfriend, pt declined to sign. LOS:  3  Expected LOS: 5-7 days    Insurance info/prescription coverage:  2001 [employer]  Date of last family contact:  1/14/21  Family requesting physician contact today:  no  Discharge plan:  Discharge home with outpatient resources  Guns in the home:  no   Outpatient provider(s):  None at this time    Participating treatment team members:  Madhav Brand III, * (assigned SW), Joel Luther LMSW

## 2021-01-15 NOTE — GROUP NOTE
[facility]  GROUP DOCUMENTATION INDIVIDUAL Group Therapy Note Date: 1/15/2021 Group Start Time: 5764 Group End Time: 1100 Group Topic: Activity Therapy [facility]M 2 BEHA HLTH ACUTE Lisa Nielsen 
 
[facility] GROUP DOCUMENTATION GROUP Group Therapy Note Attendees: 
 
  
 
Attendance: Did not attend Patient's Goal: Interventions/techniques: Follows Directions:  
 
Interactions:  
 
Mental Status:  
 
Behavior/appearance:  
 
Goals Achieved:   
 
 
Additional Notes:   
 
Marella Sandifer

## 2021-01-15 NOTE — GROUP NOTE
IP  GROUP DOCUMENTATION INDIVIDUAL Group Therapy Note Date: 1/15/2021 Group Start Time: 1300 Group End Time: 8873 Group Topic: Process Group - Inpatient SRM 2  NON ACUTE Alonzo Wheeler [facility] GROUP DOCUMENTATION GROUP Group Therapy Note Patients were encouraged to shared goals at [facility] and progress towards goals as an introduction activity. Patients were encouraged to shared events leading to admission, experience at [facility], and any other information about themselves. Patients were encouraged to provide peers with feedback, support, and problem solving. Patients were encouraged to reflect on group experience. Attendees: 6/11 Attendance: Did not attend Patient's Goal:  N/A Interventions/techniques: N/A Follows Directions: N/A Interactions: N/A Mental Status: N/A Behavior/appearance: N/A Goals Achieved: N/A Additional Notes:  Patient was encouraged but did not attend group Blanca Mccord

## 2021-01-15 NOTE — BH NOTES
Client is easily irritated. He complained of not being able to swallow due to side effects from meds but at the same time ate two trays of food. Client was given some vistaril and verbalized relief. Within four hours after eating lunch he complained again and geodon was discontinued and client was given another vistaril. Client complained that he had problems swallowing but swallowed the vistaril without any problem and asked if that was all he was going to get. Client was given cogentin im and client drifted off to sleep. Remains on close observation for safety.

## 2021-01-15 NOTE — BH NOTES
Patient case discussed in the treatment team patient remains passive-aggressive no active participation quite unhappy irritable no agitation no aggression tell the staff the father sent him here because he wanted him to get disability check. No specific goals except 1 to get. He says his girlfriend may be of some help to him we will try to reach out for collateral information also to help with any dispositional plans. His blood pressure today 150/78 pulse 88.   Not made an attempt to harm himself or others no suicidal expression

## 2021-01-16 PROCEDURE — 74011250637 HC RX REV CODE- 250/637: Performed by: PSYCHIATRY & NEUROLOGY

## 2021-01-16 PROCEDURE — 65220000003 HC RM SEMIPRIVATE PSYCH

## 2021-01-16 RX ADMIN — BENZTROPINE MESYLATE 0.5 MG: 1 TABLET ORAL at 09:05

## 2021-01-16 RX ADMIN — BENZTROPINE MESYLATE 0.5 MG: 1 TABLET ORAL at 21:06

## 2021-01-16 RX ADMIN — OLANZAPINE 5 MG: 5 TABLET, FILM COATED ORAL at 09:04

## 2021-01-16 RX ADMIN — OLANZAPINE 5 MG: 5 TABLET, FILM COATED ORAL at 21:06

## 2021-01-16 NOTE — BH NOTES
DAYSHIFT NOTE:     Patient is up for his breakfast this morning and then went back to his room to lay down. Patient was hesitant to take his medications this morning due to \"a side effect of a medication that he is not sure what caused the side effects yesterday. \" Patient stated that he had \"lock jaw\" and \"it was very scary for him. \" Patient declined wanting his nicotine patch. Patient did end up taking his other prescribed medications with encouragement but was hesitant. Patient denies having any depression and or anxiety. Denies SI/HI. Denies AH/VH. Patient stated that he was here voluntarily b/c \"his dad was worried about him\" but would not go into any further details and asked the writer when he was going to be discharged b/c he stated that he had been here for like 6 or 7 days. Patient encouraged to discuss his concerns with the doctor today and unless it was a planned discharge that discharges were not likely on the weekend. Patient has been to one group today. Patient is up for his meals. Patient walked around the unit one time this afternoon for a few laps and went back to his room to lay down. Close observations continued to ensure patient safety. No physical complaints noted. Will continue to monitor.

## 2021-01-16 NOTE — BH NOTES
Behavioral Health Treatment Team Note     Patient goal(s) for today: Pt reports goal is to go home as soon as possible.  Treatment team focus/goals: Continue to rpovide treatment    Progress note: Pt presents as polite, engaged, soft spoken, even mood, poor smell.  Pt reports that he is feeling better today as he has accepted the fact that he will have to stay in the hospital for a while.  Pt reports mood is much more even today.  Pt reports that he spoke with his father, reports that their conversations have improved, less yelling and crying.  Pt presents as much more calm today, however still somewhat negative in outlook.    LOS:  4  Expected LOS: 5-7 days    Insurance info/prescription coverage:  [employer] [place]  Date of last family contact:  1/14/2021  Family requesting physician contact today:  no  Discharge plan:  Discharge home with outpatient resourcces  Guns in the home:  no   Outpatient provider(s):  None at this time    Participating treatment team members: Andrey Tejada III, * (assigned SANDY), Marilynn Mercado LMSW

## 2021-01-16 NOTE — BH NOTES
Eliana Hartman is seen in the chin outside his room. He says he is feeling well. He has been sleeping well and eating well. He feels he has fully recovered and would like to go home. Discussed with patient that his daughter has not made any discharge plans. Patient was brought by Chu Vance who dropped patient for suicidal ideation and racing thoughts with poor insight and judgment. Patient came in all over again in the hallway reports that he is ready to go home. Mental status examination-patient presents alert oriented to name place being in the hospital presents still disheveled impaired insight judgment limited conversation    Assessment plan  Patient needs continued inpatient level of care.   Continue group therapy  Family meeting as and when appropriate   no side effects reported      Current Facility-Administered Medications:     OLANZapine (ZyPREXA) tablet 5 mg, 5 mg, Oral, BID, Jude MANZANO MD, 5 mg at 01/16/21 1911    benztropine (COGENTIN) tablet 0.5 mg, 0.5 mg, Oral, BID, Pretty Morocho MD, 0.5 mg at 01/16/21 0905    nicotine (NICODERM CQ) 21 mg/24 hr patch 1 Patch, 1 Patch, TransDERmal, DAILY, Levar Peacock MD, 1 Patch at 01/14/21 1535    hydrOXYzine HCL (ATARAX) tablet 50 mg, 50 mg, Oral, QID PRN, Pretty Morocho MD, 50 mg at 01/15/21 1318    traZODone (DESYREL) tablet 50 mg, 50 mg, Oral, QHS PRN, Pretty Morocho MD, 50 mg at 01/12/21 2131    acetaminophen (TYLENOL) tablet 650 mg, 650 mg, Oral, Q4H PRN, Jame Peacock MD    magnesium hydroxide (MILK OF MAGNESIA) 400 mg/5 mL oral suspension 30 mL, 30 mL, Oral, DAILY PRN, Levar Peacock MD    ziprasidone (GEODON) 10 mg in sterile water (preservative free) 0.5 mL injection, 10 mg, IntraMUSCular, Q12H PRN, Levar Peacock MD    alum-mag hydroxide-simeth (MYLANTA) oral suspension 30 mL, 30 mL, Oral, Q4H PRN, Pretty Morocho MD

## 2021-01-16 NOTE — GROUP NOTE
[facility] GROUP DOCUMENTATION INDIVIDUAL Group Therapy Note Date: 1/16/2021 Group Start Time: 1100 Group End Time: 7329 Group Topic: Process Group - Inpatient [facility] 
 
[facility] GROUP DOCUMENTATION GROUP Group Therapy Note Attendees: Patients encouraged to discuss how they are feeling, the things that have been on their mind, how they came to the hospital, what they have learned from their time here. Patients encouraged to discuss openly and give each other peer support and feedback. Attendance: Attended Patient's Goal:  Attends activities and groups Interventions/techniques: Validated, Promoted peer support, Reinforced and Supported Follows Directions: Followed directions Interactions: Interacted appropriately Mental Status: Blunted, Calm and Restricted Behavior/appearance: Attentive, Cooperative, Negative and Withdrawn/quiet Goals Achieved: Able to engage in interactions, Able to listen to others, Able to self-disclose and Identified feelings Additional Notes:  Pt attended group and was engaged. Pt shared that he felt fine today. Pt reported that he doesn't like to plan things because when he makes a plan it always goes wrong. Pt encouraged by group to try not to think that way as it is speaking failure into existence. Mandy Rey

## 2021-01-16 NOTE — PROGRESS NOTES
Problem: Suicide  Goal: *STG: Remains safe in hospital  Outcome: Progressing Towards Goal  Goal: *STG/LTG: Complies with medication therapy  Outcome: Progressing Towards Goal     Problem: Substance Use - Stabilization Plan  Goal: Patient/Family Education  Outcome: Progressing Towards Goal     Problem: Falls - Risk of  Goal: *Absence of Falls  Description: Document Aneudy Fall Risk and appropriate interventions in the flowsheet.   Outcome: Progressing Towards Goal  Note: Fall Risk Interventions:                                Problem: Patient Education: Go to Patient Education Activity  Goal: Patient/Family Education  Outcome: Progressing Towards Goal

## 2021-01-16 NOTE — BH NOTES
Patient case discussed in the treatment team this morning patient remains poorly cooperative although want to does go home or stay in the bed. He is taking medications namely Geodon. Yesterday he says we will can talk to his girlfriend today he says he do not want to have talked his girlfriend today and today his uncle can help. He constantly is switching the conversation he is telling that he is getting good clonazepam 2 mg dose but looking better on the primary care physician he was getting clonazepam 0.5 mg twice daily as needed which he is getting here we also offered a mood stabilizer he says he tried he did not retry any we talked about Geodon during Depakote lithium Seroquel that will carbamazepine. And there is patient had some thickness of the tongue that after swallowing but then he ate food in his he took his pills. We will go and stop his do not give Cogentin 1 mg IM and then half a milligram twice daily switched to Zyprexa 5 mg his blood pressure 118/5363 pulse 80 patient tells that his father sent him because her father want apply for him to his ability so far patient has not acted out not aggressive in going and he was posturing.   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

## 2021-01-16 NOTE — BH NOTES
Mr. Denny Campo was received in bed room where he stayed for the entire shift. Mood remained labile. Patient got irritated if this writer tried to start a conversation. He insisted that he does not want to talk. No negative behavior noted. Remained under close supervision for safety.

## 2021-01-17 PROCEDURE — 65220000003 HC RM SEMIPRIVATE PSYCH

## 2021-01-17 PROCEDURE — 74011250637 HC RX REV CODE- 250/637: Performed by: PSYCHIATRY & NEUROLOGY

## 2021-01-17 RX ADMIN — OLANZAPINE 5 MG: 5 TABLET, FILM COATED ORAL at 09:00

## 2021-01-17 RX ADMIN — BENZTROPINE MESYLATE 0.5 MG: 1 TABLET ORAL at 09:00

## 2021-01-17 NOTE — BH NOTES
DAYSHIFT NOTE:     Patient is up for his breakfast and goes back to his room to lay down. Patient has short, mumbled responses with the writer this morning while keeping most of his head and mouth covered with his blankets. Patient stated that he was \"doing good\" this morning but presents very flat and constricted. Patient denies having any depression and or anxiety this morning. Denies SI/HI. Denies AH/VH. Patient initially refused his medications this morning and stated, \"I do not need them today. \" Patient was asked again to confirm that he was refusing his medications and patient became agitated and stated, \"[email or web address], yes. \" Luma Zuleika left his room and reassured him that writer would let Dr. Serafin Mora know of the refusal. Dr. Serafin Mora was made aware. Later this morning patient came back to nurses station and asked for his medications and apologized and stated, \"I was half asleep this morning. \" Writer administered his medications. Patient has since been up for his meals and sits in the dayroom for short periods and will go lay in his room and mainly stay to himself. Patient has a very flat affect. Close observations continued to ensure patient safety. Will continue to monitor.

## 2021-01-17 NOTE — BH NOTES
Mr. Lucy Matthews 69-year-old  male had refused medications this morning but later reported that he was sleeping and did not realize. Patient continues to have impaired insight into his treatment and his stressors and challenges leading to his hospitalization. He still remains very superficial not needing to be in the hospital and wanting to leave. Denied any active suicidal homicidal ideation.   He still appears somewhat disheveled not seen much active participation in group therapy    Mental status examination  The child no active SI HI noted but was cooperative this morning no evidence of any active psychosis and loose associations of thoughts    Assessment plan  Continue current medications  Encouraged participation in group therapy  Patient focused on wanting to go home    Current Facility-Administered Medications:     OLANZapine (ZyPREXA) tablet 5 mg, 5 mg, Oral, BID, Levar Peacock MD, 5 mg at 01/17/21 0900    benztropine (COGENTIN) tablet 0.5 mg, 0.5 mg, Oral, BID, Kb Drake MD, 0.5 mg at 01/17/21 0900    nicotine (NICODERM CQ) 21 mg/24 hr patch 1 Patch, 1 Patch, TransDERmal, DAILY, Kb Darke MD, 1 Patch at 01/14/21 1535    hydrOXYzine HCL (ATARAX) tablet 50 mg, 50 mg, Oral, QID PRN, Kb Drake MD, 50 mg at 01/15/21 1318    traZODone (DESYREL) tablet 50 mg, 50 mg, Oral, QHS PRN, Kb Drake MD, 50 mg at 01/12/21 2131    acetaminophen (TYLENOL) tablet 650 mg, 650 mg, Oral, Q4H PRN, Kb Drake MD    magnesium hydroxide (MILK OF MAGNESIA) 400 mg/5 mL oral suspension 30 mL, 30 mL, Oral, DAILY PRN, Kb Drake MD    ziprasidone (GEODON) 10 mg in sterile water (preservative free) 0.5 mL injection, 10 mg, IntraMUSCular, Q12H PRN, Levar Peacock MD    alum-mag hydroxide-simeth (MYLANTA) oral suspension 30 mL, 30 mL, Oral, Q4H PRN, Kb Drake MD

## 2021-01-17 NOTE — GROUP NOTE
[facility]  GROUP DOCUMENTATION INDIVIDUAL Group Therapy Note Date: 1/17/2021 Group Start Time: 6829 Group End Time: 3716 Group Topic: [employer] SRM 2 BH NON ACUTE Pilo Andres [facility] GROUP DOCUMENTATION GROUP Group Therapy Note Attendees: 7 Attendance: Attended Patient's Goal:  Take his medicine, not be rude to the nurses when time to take medicine and do the right thing so that he can be discharged soon. Interventions/techniques: Supported Follows Directions: Followed directions Interactions: Interacted appropriately Mental Status: Other Good Behavior/appearance: Cooperative Goals Achieved: Able to listen to others and Identified feelings Additional Notes:   
 
Pamela Encinas

## 2021-01-17 NOTE — GROUP NOTE
IP  GROUP DOCUMENTATION INDIVIDUAL Group Therapy Note Date: 1/17/2021 Group Start Time: 1100 Group End Time: 8769 Group Topic: Process Group - Inpatient SRM 2 BH NON ACUTE [facility] GROUP DOCUMENTATION GROUP Group Therapy Note This worker facilitated a process group geared towards the following topics: mental health management, self-care, safety planning, treatment goals, and outpatient resources. The patient was asked to reflect on their current stressors and goals for treatment. The patient was encouraged to share how they can manage their mental health after discharge and triggers that can lead to a relapse. Attendees: 6 out of 13 Attendance: Did not attend Patient's Goal:  N/A Interventions/techniques: N/A Follows Directions: N/A Interactions: N/A Mental Status: N/A Behavior/appearance: N/A Goals Achieved: N/A Additional Notes:  Patient was observed sleeping when prompted to attend today's process group. Danielle Drake

## 2021-01-17 NOTE — PROGRESS NOTES
Problem: Suicide  Goal: *STG: Remains safe in hospital  Outcome: Progressing Towards Goal     Problem: Suicide  Goal: *STG/LTG: No longer expresses self destructive or suicidal thoughts  Outcome: Progressing Towards Goal     Patient has been safe so far this shift. Patient has denied any self-destructive thoughts.

## 2021-01-17 NOTE — BH NOTES
Patient came to the nurses station and asked if he could take his medication. Patient stated, \"I was sleeping earlier and he was sorry. \" Patient was given his morning medications at this time. Blood pressure was rechecked and 112/65. Patient is up and has a snack.

## 2021-01-17 NOTE — BH NOTES
REFUSED MORNING MEDICATIONS:    Patient refused his morning medications this morning and stated, \"I do not need them today. \" Patient mumbled and was hard to understand as he kept his face half covered with his blanket and stated, \"no, god d*mn. \" Writer reassured the patient that Dr. Marisa Anderson would be notified. Will continue to monitor.

## 2021-01-18 VITALS
BODY MASS INDEX: 21.47 KG/M2 | DIASTOLIC BLOOD PRESSURE: 62 MMHG | OXYGEN SATURATION: 100 % | RESPIRATION RATE: 18 BRPM | WEIGHT: 150 LBS | HEART RATE: 121 BPM | HEIGHT: 70 IN | TEMPERATURE: 97.8 F | SYSTOLIC BLOOD PRESSURE: 106 MMHG

## 2021-01-18 PROCEDURE — 74011250637 HC RX REV CODE- 250/637: Performed by: PSYCHIATRY & NEUROLOGY

## 2021-01-18 RX ORDER — HYDROXYZINE 50 MG/1
25 TABLET, FILM COATED ORAL
Qty: 30 TAB | Refills: 1 | Status: SHIPPED | OUTPATIENT
Start: 2021-01-18 | End: 2021-02-17

## 2021-01-18 RX ORDER — OLANZAPINE 5 MG/1
5 TABLET ORAL 2 TIMES DAILY
Qty: 60 TAB | Refills: 0 | Status: SHIPPED | OUTPATIENT
Start: 2021-01-18 | End: 2021-03-09 | Stop reason: SDUPTHER

## 2021-01-18 RX ORDER — BENZTROPINE MESYLATE 0.5 MG/1
0.5 TABLET ORAL 2 TIMES DAILY
Qty: 30 TAB | Refills: 1 | Status: SHIPPED | OUTPATIENT
Start: 2021-01-18 | End: 2021-03-09 | Stop reason: SDUPTHER

## 2021-01-18 RX ORDER — IBUPROFEN 200 MG
1 TABLET ORAL DAILY
Qty: 30 PATCH | Refills: 0 | Status: SHIPPED | OUTPATIENT
Start: 2021-01-19 | End: 2021-02-18

## 2021-01-18 RX ORDER — CLONAZEPAM 0.5 MG/1
0.5 TABLET ORAL
Qty: 30 TAB | Refills: 0 | Status: SHIPPED | OUTPATIENT
Start: 2021-01-18 | End: 2021-02-17

## 2021-01-18 RX ORDER — TRAZODONE HYDROCHLORIDE 50 MG/1
50 TABLET ORAL
Qty: 15 TAB | Refills: 1 | Status: SHIPPED | OUTPATIENT
Start: 2021-01-18 | End: 2021-03-09 | Stop reason: SDUPTHER

## 2021-01-18 RX ADMIN — OLANZAPINE 5 MG: 5 TABLET, FILM COATED ORAL at 09:13

## 2021-01-18 RX ADMIN — BENZTROPINE MESYLATE 0.5 MG: 1 TABLET ORAL at 09:13

## 2021-01-18 NOTE — BH NOTES
SAFETY PLAN    A suicide Safety Plan is a document that supports someone when they are having thoughts of suicide. Warning Signs that indicate a suicidal (anxious) crisis may be developing: What (situations, thoughts, feelings, body sensations, behaviors, etc.) do you experience that lets you know you are beginning to think about suicide? 1. tears   2. Nervous, afraid, scared  3. sweaty    Internal Coping Strategies:  What things can I do (relaxation techniques, hobbies, physical activities, etc.) to take my mind off my problems without contacting another person? 1. read  2. write  3. Working with headphones    People and social settings that provide distraction: Who can I call or where can I go to distract me? 1. Name: dad  Phone: [phone]  2. Name: best friend  Phone: [phone]   3. Place: outside/ fresh air            4. Place: [address] whom I can ask for help: Who can I call when I need help - for example, friends, family, clergy, someone else? 1. Name: friend                Phone: [phone]  2. Name: dad  Phone: [phone]  3. Name: [facility]  Phone: [phone]    Professionals or [address] [place], [place] [address]vd I can contact during a crisis: Who can I call for help - for example, my doctor, my psychiatrist, my psychologist, a mental health provider, a suicide hotline? 1. Clinician Name: [facility]   Phone: [phone]      Clinician Pager or Emergency Contact #:     2. Clinician Name: Jarvis Lemus   Phone: [phone]      Clinician Pager or Emergency Contact #:     3. Suicide Prevention Lifeline: 6-739-947-TALK (4343)    4. [address] [place] [address] Emergency Services -  for example, [facility] suicide hotline, [facility] Hotline: [facility]      Emergency Services Address:       Emergency Services Phone: [phone]    Making the environment safe: How can I make my environment (house/apartment/living space) safer?  For example, can I remove guns, medications, and other items? 1. Clean the dust  2.  Eat and sleep, use pill box

## 2021-01-18 NOTE — GROUP NOTE
[facility]  GROUP DOCUMENTATION INDIVIDUAL Group Therapy Note Date: 1/18/2021 Group Start Time: 1100 Group End Time: 4131 Group Topic: Process Group - Inpatient [facility]M CARE MANAGEMENT Pura Gosselin [facility] GROUP DOCUMENTATION GROUP Group Therapy Note Attendees: Patients participated in process group therapy. Patients began group by answering a check in questions, patients discussed how they were feeling on a scale of 1-10 and what events led to their admission. Patients then participated in group therapy led by the therapist.  
 
  
 
Attendance: Did not attend Kymberly Mcmahon

## 2021-01-18 NOTE — PROGRESS NOTES
Problem: Suicide  Goal: *STG: Remains safe in hospital  Outcome: Progressing Towards Goal  Goal: *STG: Seeks staff when feelings of self harm or harm towards others arise  Outcome: Progressing Towards Goal  Goal: *STG: Attends activities and groups  Outcome: Progressing Towards Goal  Goal: *STG:  Verbalizes alternative ways of dealing with maladaptive feelings/behaviors  Outcome: Progressing Towards Goal  Goal: *STG/LTG: Complies with medication therapy  Outcome: Progressing Towards Goal  Goal: *STG/LTG: No longer expresses self destructive or suicidal thoughts  Outcome: Progressing Towards Goal  Goal: *LTG:  Identifies available community resources  Outcome: Progressing Towards Goal  Goal: *LTG:  Develops proactive suicide prevention plan  Outcome: Progressing Towards Goal  Goal: Interventions  Outcome: Progressing Towards Goal     Problem: Patient Education: Go to Patient Education Activity  Goal: Patient/Family Education  Outcome: Progressing Towards Goal     Problem: Substance Use - Stabilization Plan  Goal: Patient/Family Education  Outcome: Progressing Towards Goal  Goal: *LTG: Develop increased awareness of phsical relapse triggers and coping strategies  Description: Develop increased awareness of phsical relapse triggers and coping strategies to effectively deal with them. Outcome: Progressing Towards Goal  Goal: *LTG: Develop a Crisis/Recovery Plan and Discharge Plan  Description: Develop a Crisis/Recovery Plan and Discharge Plan to define strategies to maintain sobriety while learning ways to sustain an alcohol/drug-free lifestyle  Outcome: Progressing Towards Goal  Goal: *STG: Write and present in group about crisis that led to this admission  Description: Write and present in group about crisis that led to this admission. Address behaviors, attitudes, and feelings that could have been involved.   Outcome: Progressing Towards Goal  Goal: *STG: Write and present in group personal history of substance use  Description: Write and present in group personal history of substance use and the negative consequences experienced as a result of substance use. Outcome: Progressing Towards Goal  Goal: *STG: Write and present in group positive rewards associated with a chemical-free lifestyle  Outcome: Progressing Towards Goal  Goal: *STG: Write a good-bye letter to substance of choice  Outcome: Progressing Towards Goal  Goal: *STG: Write an autobiography about first attempt to get sober to present  Description: Write an autobiography about first attempt to get sober to present. Then present in group for feedback as to triggers for relapse. Outcome: Progressing Towards Goal  Goal: *STG: Identify specific relapse triggers  Description: Identify specific relapse triggers and develop in writing two possible coping strategies for each. Outcome: Progressing Towards Goal  Goal: *STG: Identify and establish contact with a temporary sponsor  Outcome: Progressing Towards Goal  Goal: *STG: Establish contact with sponsor  Outcome: Progressing Towards Goal  Goal: *STG: Develop a written crisis/recovery plan to maintain sobriety  Description: Develop a written crisis/recovery plan to define strategies maintain sobriety while developing an alcohol/drug-free lifestyle. Outcome: Progressing Towards Goal  Goal: *STG: Write down list of previous relapse-associate behaviors, attitudes, and emotions  Description: Write down list of previous relapse-associate behaviors, attitudes, and emotions, and process with therapist and significant other. Outcome: Progressing Towards Goal  Goal: *STG: Identify and formalize a specific discharge plan  Description: Identify and formalize a specific discharge plan with appointments for the next level of chemical dependency treatment.   Outcome: Progressing Towards Goal  Goal: *STG: Meet with therapist and significant other to establish role of significant other in crisis plan  Outcome: Progressing Towards Goal  Goal: *Patient Specific Goal (EDIT GOAL, INSERT TEXT)  Outcome: Progressing Towards Goal  Goal: *Patient Specific Goal (EDIT GOAL, INSERT TEXT)  Outcome: Progressing Towards Goal  Goal: Substance Use- Stabilization Plan Interventions  Outcome: Progressing Towards Goal     Problem: Falls - Risk of  Goal: *Absence of Falls  Description: Document Aneudy Fall Risk and appropriate interventions in the flowsheet.   Outcome: Progressing Towards Goal  Note: Fall Risk Interventions:                                Problem: Patient Education: Go to Patient Education Activity  Goal: Patient/Family Education  Outcome: Progressing Towards Goal

## 2021-01-18 NOTE — BH NOTES
Behavioral Health Treatment Team Note     Patient goal(s) for today: 'find out dc plan', 'go to [facility]'  Treatment team focus/goals: DC planning, group therapy    Progress note: Patient presented slightly manic and upbeat. Patient very excited about DC today and going home to see his dog. Patient discussed that he needed to go to [facility] to get some 'good food'. Patient thoughts and speech clear and organized     LOS:  6  Expected LOS: 5-7    Insurance info/prescription coverage:  [employer]  Date of last family contact:  1/14/21  Family requesting physician contact today:  no  Discharge plan:  Patient and therapist will work together to determine [address] [place] [address] Street in the home:  no   Outpatient provider(s):  None at this time will be coordinated prior to DC     Participating treatment team members:  Merlinda Chalk III, * (assigned SW), Ruby Montero

## 2021-01-18 NOTE — PROGRESS NOTES
Problem: Suicide  Goal: *STG: Remains safe in hospital  Outcome: Progressing Towards Goal     Problem: Falls - Risk of  Goal: *Absence of Falls  Description: Document Aneudy Fall Risk and appropriate interventions in the flowsheet. Outcome: Progressing Towards Goal  Note: Fall Risk Interventions:     Patient has remained safe so far this shift. Patient has not fallen so far this shift. Patient remains on close observation. Patient has been laying in bed all shift. Patient has not voiced any depression, anxiety, SI or HI. Patient's hs BP was slightly low so his hs medications were held. Continue to assess. Since going to bed, patient has been laying down quietly with his eyes closed.

## 2021-01-18 NOTE — BH NOTES
DISCHARGE SUMMARY    Sarabjit Lawson III  : 1990  MRN: [id number]    The patient Romie Ratliff III exhibits the ability to control behavior in a less restrictive environment. Patient's level of functioning is improving. No assaultive/destructive behavior has been observed for the past 24 hours. No suicidal/homicidal threat or behavior has been observed for the past 24 hours. There is no evidence of serious medication side effects. Patient has not been in physical or protective restraints for at least the past 24 hours. If weapons involved, how are they secured? Yes, pt's father reported removing the one firearm from the home. Is patient aware of and in agreement with discharge plan? yes    Arrangements for medication:  Prescriptions given to patient, given a weeks supply or 30 day supply. Copy of discharge instructions to provider?:  yes    Arrangements for transportation home:  Yes, father    Keep all follow up appointments as scheduled, continue to take prescribed medications per physician instructions.   Mental health crisis number:  387 or your local mental health crisis line number at [address] [facility] WARM LINE      [phone] ([phone])      M-F: 9am to 9pm      Sat & Sun: 5pm  9pm  National suicide prevention lines:                             4-875-TUIJQCR ([phone])       [phone]-TALK ([phone])    Crisis Text Line:  Text HOME to [phone]

## 2021-01-18 NOTE — GROUP NOTE
AYLEEN  GROUP DOCUMENTATION INDIVIDUAL Group Therapy Note Date: 1/18/2021 Group Start Time: 1400 Group End Time: 1500 Group Topic: AA/NA 
 
[facility]  GROUP DOCUMENTATION GROUP Group Therapy Note The therapist facilitated a group discussing withdrawal symptoms, and challenged patient's in regards to their addictive thinking. Attendees: 5 Attendance: Attended Patient's Goal:  To attend groups Interventions/techniques: Challenged, Informed, Validated and Supported Follows Directions: Followed directions Interactions: Interacted appropriately Mental Status: Anxious Behavior/appearance: Attentive, Cooperative, Negative and Withdrawn/quiet Goals Achieved: Able to engage in interactions, Able to listen to others, Able to reflect/comment on own behavior and Able to self-disclose Additional Notes: The patient was quiet at first and required prompting to share. 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 He said that people cannot change if they are not ready to change, or recover. He said that people should not waste other people's time by going to rehab when they are not ready for it. Gardiner Osler

## 2021-01-18 NOTE — BH NOTES
Writer prescreened by V[facility] for TDO. Samaria Leavitt reported pt does not meet criteria for TDO at this time. Dr. Lucas So notified and reported pt can discharge tomorrow.

## 2021-01-18 NOTE — PROGRESS NOTES
Problem: Suicide  Goal: *STG: Remains safe in hospital  1/18/2021 1647 by Geri Vickers RN  Outcome: Resolved/Met  1/18/2021 1155 by Geri Vickers RN  Outcome: Progressing Towards Goal  Goal: *STG: Seeks staff when feelings of self harm or harm towards others arise  1/18/2021 1647 by Geri Vickers RN  Outcome: Resolved/Met  1/18/2021 1155 by Geri Vickers RN  Outcome: Progressing Towards Goal  Goal: *STG: Attends activities and groups  1/18/2021 1647 by Geri Vickers RN  Outcome: Resolved/Met  1/18/2021 1155 by Geri Vickers RN  Outcome: Progressing Towards Goal  Goal: *STG:  Verbalizes alternative ways of dealing with maladaptive feelings/behaviors  1/18/2021 1647 by Geri Vickers RN  Outcome: Resolved/Met  1/18/2021 1155 by Geri Vickers RN  Outcome: Progressing Towards Goal  Goal: *STG/LTG: Complies with medication therapy  1/18/2021 1647 by Geri Vickers RN  Outcome: Resolved/Met  1/18/2021 1155 by Geri Vickers RN  Outcome: Progressing Towards Goal  Goal: *STG/LTG: No longer expresses self destructive or suicidal thoughts  1/18/2021 1647 by Geri Vickers RN  Outcome: Resolved/Met  1/18/2021 1155 by Geri Vickers RN  Outcome: Progressing Towards Goal  Goal: *LTG:  Identifies available community resources  1/18/2021 1647 by Geri iVckers RN  Outcome: Resolved/Met  1/18/2021 1155 by Geri Vickers RN  Outcome: Progressing Towards Goal  Goal: *LTG:  Develops proactive suicide prevention plan  1/18/2021 1647 by Geri Vickers RN  Outcome: Resolved/Met  1/18/2021 1155 by Geri Vickers RN  Outcome: Progressing Towards Goal  Goal: Interventions  1/18/2021 1647 by Geri Vickers RN  Outcome: Resolved/Met  1/18/2021 1155 by Geri Vickers RN  Outcome: Progressing Towards Goal     Problem: Patient Education: Mario Apley to Patient Education Activity  Goal: Patient/Family Education  1/18/2021 1647 by Rob Cartagena RN  Outcome: Resolved/Met  1/18/2021 1155 by Rob Cartagena RN  Outcome: Progressing Towards Goal     Problem: Substance Use - Stabilization Plan  Goal: Patient/Family Education  1/18/2021 1647 by Rob Cartagena RN  Outcome: Resolved/Met  1/18/2021 1155 by Rob Cartagena RN  Outcome: Progressing Towards Goal  Goal: *LTG: Develop increased awareness of phsical relapse triggers and coping strategies  Description: Develop increased awareness of phsical relapse triggers and coping strategies to effectively deal with them. 1/18/2021 1647 by Rob Cartagena RN  Outcome: Resolved/Met  1/18/2021 1155 by Rob Cartagena RN  Outcome: Progressing Towards Goal  Goal: *LTG: Develop a Crisis/Recovery Plan and Discharge Plan  Description: Develop a Crisis/Recovery Plan and Discharge Plan to define strategies to maintain sobriety while learning ways to sustain an alcohol/drug-free lifestyle  1/18/2021 1647 by Rob Cartagena RN  Outcome: Resolved/Met  1/18/2021 1155 by Rob Cartagena RN  Outcome: Progressing Towards Goal  Goal: *STG: Write and present in group about crisis that led to this admission  Description: Write and present in group about crisis that led to this admission. Address behaviors, attitudes, and feelings that could have been involved. 1/18/2021 1647 by Rob Cartagena RN  Outcome: Resolved/Met  1/18/2021 1155 by Rob Cartagena RN  Outcome: Progressing Towards Goal  Goal: *STG: Write and present in group personal history of substance use  Description: Write and present in group personal history of substance use and the negative consequences experienced as a result of substance use.   1/18/2021 1647 by Rob Cartagena RN  Outcome: Resolved/Met  1/18/2021 1155 by Rob Cartagena RN  Outcome: Progressing Towards Goal  Goal: *STG: Write and present in group positive rewards associated with a chemical-free lifestyle  1/18/2021 1647 by Angela Velez RN  Outcome: Resolved/Met  1/18/2021 1155 by Angela Velez RN  Outcome: Progressing Towards Goal  Goal: *STG: Write a good-bye letter to substance of choice  1/18/2021 1647 by Angela Velez RN  Outcome: Resolved/Met  1/18/2021 1155 by Angela Velez RN  Outcome: Progressing Towards Goal  Goal: *STG: Write an autobiography about first attempt to get sober to present  Description: Write an autobiography about first attempt to get sober to present. Then present in group for feedback as to triggers for relapse. 1/18/2021 1647 by Angela Velez RN  Outcome: Resolved/Met  1/18/2021 1155 by Angela Velez RN  Outcome: Progressing Towards Goal  Goal: *STG: Identify specific relapse triggers  Description: Identify specific relapse triggers and develop in writing two possible coping strategies for each. 1/18/2021 1647 by Angela Velez RN  Outcome: Resolved/Met  1/18/2021 1155 by Angela Velez RN  Outcome: Progressing Towards Goal  Goal: *STG: Identify and establish contact with a temporary sponsor  1/18/2021 1647 by Angela Velez RN  Outcome: Resolved/Met  1/18/2021 1155 by Angela Velez RN  Outcome: Progressing Towards Goal  Goal: *STG: Establish contact with sponsor  1/18/2021 1647 by Angela Velez RN  Outcome: Resolved/Met  1/18/2021 1155 by Angela Velez RN  Outcome: Progressing Towards Goal  Goal: *STG: Develop a written crisis/recovery plan to maintain sobriety  Description: Develop a written crisis/recovery plan to define strategies maintain sobriety while developing an alcohol/drug-free lifestyle.   1/18/2021 1647 by Angela Velez RN  Outcome: Resolved/Met  1/18/2021 1155 by Angela Velez RN  Outcome: Progressing Towards Goal  Goal: *STG: Write down list of previous relapse-associate behaviors, attitudes, and emotions  Description: Write down list of previous relapse-associate behaviors, attitudes, and emotions, and process with therapist and significant other. 1/18/2021 1647 by Seth Braswell RN  Outcome: Resolved/Met  1/18/2021 1155 by Seth Braswell RN  Outcome: Progressing Towards Goal  Goal: *STG: Identify and formalize a specific discharge plan  Description: Identify and formalize a specific discharge plan with appointments for the next level of chemical dependency treatment. 1/18/2021 1647 by Seth Braswell RN  Outcome: Resolved/Met  1/18/2021 1155 by Seth Braswell RN  Outcome: Progressing Towards Goal  Goal: *STG: Meet with therapist and significant other to establish role of significant other in crisis plan  1/18/2021 1647 by Seth Braswell RN  Outcome: Resolved/Met  1/18/2021 1155 by Seth Braswell RN  Outcome: Progressing Towards Goal  Goal: *Patient Specific Goal (EDIT GOAL, INSERT TEXT)  1/18/2021 1647 by Seth Braswell RN  Outcome: Resolved/Met  1/18/2021 1155 by Seth Braswell RN  Outcome: Progressing Towards Goal  Goal: *Patient Specific Goal (EDIT GOAL, INSERT TEXT)  1/18/2021 1647 by Seth Braswell RN  Outcome: Resolved/Met  1/18/2021 1155 by Seth Braswell RN  Outcome: Progressing Towards Goal  Goal: Substance Use- Stabilization Plan Interventions  1/18/2021 1647 by Seth Braswell RN  Outcome: Resolved/Met  1/18/2021 1155 by Seth Braswell RN  Outcome: Progressing Towards Goal     Problem: Falls - Risk of  Goal: *Absence of Falls  Description: Document Ana Laura Flow Fall Risk and appropriate interventions in the flowsheet.   1/18/2021 1647 by Seth Braswell RN  Outcome: Resolved/Met  Note: Fall Risk Interventions: 1/18/2021 1155 by Mazin Jones RN  Outcome: Progressing Towards Goal  Note: Fall Risk Interventions:                                Problem: Patient Education: Go to Patient Education Activity  Goal: Patient/Family Education  1/18/2021 1647 by Mazin Jones RN  Outcome: Resolved/Met  1/18/2021 1155 by Mazin Jones RN  Outcome: Progressing Towards Goal

## 2021-01-18 NOTE — BH NOTES
Patient had phone assessment today with [facility]. Did not meet criteria for TDO as determined by [facility] staff. Patient discharged in stable mental and physical health. Patient verbalized understanding of discharge instructions. Belongings from storage returned to patient. Patient refused tobacco cessation. Patient refused flu vaccine. No physical complaints at time of discharge. Denied SI/HI/AVH. Escorted off unit by staff at [phone], leaving ursula personal vehicle with his father.

## 2021-01-18 NOTE — BH NOTES
Behavioral Health Transition Record to Provider    Patient Name: Germania Minor III  YOB: 1990  Medical Record Number: [id number]  Date of Admission: 1/11/2021  Date of Discharge: 1/18/21    Attending Provider: Mili Rodriguez MD  Discharging Provider: Dr. Evelia Dorman  To contact this individual call [phone] and ask the  to page. If unavailable, ask to be transferred to [facility] Provider on call. [facility] Provider will be available on call 24/7 and during holidays. Primary Care Provider: None    Allergies   Allergen Reactions    Sulfa (Sulfonamide Antibiotics) Unknown (comments)     States has always been told allergic but does not recall any specific reaction       Reason for Admission: SI    Admission Diagnosis: Suicidal ideation [R45.851]    * No surgery found *    Results for orders placed or performed during the hospital encounter of 01/11/21   SARS-COV-2   Result Value Ref Range    SARS-CoV-2 Not Detected Not Detected     CBC WITH AUTOMATED DIFF   Result Value Ref Range    WBC 8.9 4.1 - 11.1 K/uL    RBC 4.60 4.10 - 5.70 M/uL    HGB 14.7 12.1 - 17.0 g/dL    HCT 43.0 36.6 - 50.3 %    MCV 93.5 80.0 - 99.0 FL    MCH 32.0 26.0 - 34.0 PG    MCHC 34.2 30.0 - 36.5 g/dL    RDW 12.9 11.5 - 14.5 %    PLATELET 680 600 - 888 K/uL    MPV 11.6 8.9 - 12.9 FL    NEUTROPHILS 56 32 - 75 %    LYMPHOCYTES 35 12 - 49 %    MONOCYTES 5 5 - 13 %    EOSINOPHILS 3 0 - 7 %    BASOPHILS 1 0 - 1 %    IMMATURE GRANULOCYTES 0 0.0 - 0.5 %    ABS. NEUTROPHILS 5.1 1.8 - 8.0 K/UL    ABS. LYMPHOCYTES 3.1 0.8 - 3.5 K/UL    ABS. MONOCYTES 0.4 0.0 - 1.0 K/UL    ABS. EOSINOPHILS 0.2 0.0 - 0.4 K/UL    ABS. BASOPHILS 0.1 0.0 - 0.1 K/UL    ABS. IMM.  GRANS. 0.0 0.00 - 0.04 K/UL    DF AUTOMATED     METABOLIC PANEL, BASIC   Result Value Ref Range    Sodium 139 136 - 145 mmol/L    Potassium 3.8 3.5 - 5.1 mmol/L    Chloride 106 97 - 108 mmol/L    CO2 28 21 - 32 mmol/L    Anion gap 5 5 - 15 mmol/L    Glucose 91 65 - 100 mg/dL    BUN 17 6 - 20 mg/dL    Creatinine 1.11 0.70 - 1.30 mg/dL    BUN/Creatinine ratio 15 12 - 20      GFR est AA >60 >60 ml/min/1.73m2    GFR est non-AA >60 >60 ml/min/1.73m2    Calcium 9.5 8.5 - 10.1 mg/dL   ETHYL ALCOHOL   Result Value Ref Range    ALCOHOL(ETHYL),SERUM <4 <10 mg/dL   URINALYSIS W/ RFLX MICROSCOPIC   Result Value Ref Range    Color Yellow/Straw      Appearance Clear Clear      Specific gravity 1.029 1.003 - 1.030      pH (UA) 5.0 5.0 - 8.0      Protein Negative Negative mg/dL    Glucose Negative Negative mg/dL    Ketone Negative Negative mg/dL    Bilirubin Negative Negative      Blood Negative Negative      Urobilinogen 0.1 0.1 - 1.0 EU/dL    Nitrites Negative Negative      Leukocyte Esterase Negative Negative      WBC 0-4 0 - 4 /hpf    RBC 0-5 0 - 5 /hpf    Bacteria Negative Negative /hpf    Mucus Trace /lpf   DRUG SCREEN, URINE   Result Value Ref Range    AMPHETAMINES Positive (A) Negative      BARBITURATES Negative Negative      BENZODIAZEPINES Positive (A) Negative      COCAINE Negative Negative      METHADONE Negative Negative      OPIATES Negative Negative      PCP(PHENCYCLIDINE) Negative Negative      THC (TH-CANNABINOL) Positive (A) Negative      Drug screen comment        This test is a screen for drugs of abuse in a medical setting only (i.e., they are unconfirmed results and as such must not be used for non-medical purposes, e.g.,employment testing, legal testing). Due to its inherent nature, false positive (FP) and false negative (FN) results may be obtained. Therefore, if necessary for medical care, recommend confirmation of positive findings by GC/MS.    SARS-COV-2   Result Value Ref Range    SARS-CoV-2 Please find results under separate order         Immunizations administered during this encounter:   Immunization History   Administered Date(s) Administered    TD Vaccine 05/20/2011    Tdap 07/19/2015       Screening for Metabolic Disorders for Patients on Antipsychotic Medications  (Data obtained from the EMR)    Estimated Body Mass Index  Estimated body mass index is 21.52 kg/m² as calculated from the following:    Height as of this encounter: 5' 10\" (1.778 m). Weight as of this encounter: 68 kg (150 lb). Vital Signs/Blood Pressure  Visit Vitals  /62   Pulse (!) 121   Temp 97.8 °F (36.6 °C)   Resp 18   Ht 5' 10\" (1.778 m)   Wt 68 kg (150 lb)   SpO2 100%   BMI 21.52 kg/m²       Blood Glucose/Hemoglobin A1c  Lab Results   Component Value Date/Time    Glucose 91 01/11/2021 03:01 PM    Glucose (POC) 91 05/20/2011 05:20 PM       No results found for: HBA1C, HGBE8, TGP1OVPT     Lipid Panel  No results found for: CHOL, CHOLX, CHLST, CHOLV, 460647, HDL, HDLP, LDL, LDLC, DLDLP, TGLX, TRIGL, TRIGP, CHHD, CHHDX     Discharge Diagnosis: Suicidal ideation [R45.851]    Discharge Plan: Patient will DC home with father and follow up Bette Perry at [facility], for medication management, on 2/2/2021 at 1030. Pt will also f/u with [address] for further services for substance abuse. Discharge Medication List and Instructions:   Current Discharge Medication List          Unresulted Labs (24h ago, onward)    None        To obtain results of studies pending at discharge, please contact [phone]    Follow-up Information     Follow up With Specialties Details Why [address] on 2/2/2021 Appointment for medication management with Bette Perry at 21 , in office [address] [place] [id number]  [facility] 19  Call in 1 day Call Same Day Access to coordinate services [address], [place], [address] [place] [address]          Advanced Directive:   Does the patient have an appointed surrogate decision maker? No  Does the patient have a Medical Advance Directive? No  Does the patient have a Psychiatric Advance Directive?  No  If the patient does not have a surrogate or Medical Advance Directive AND Psychiatric Advance Directive, the patient was offered information on these advance directives Patient declined to complete    Patient Instructions: Please continue all medications until otherwise directed by physician. Tobacco Cessation Discharge Plan:   Is the patient a smoker and needs referral for smoking cessation? Yes  Patient referred to the following for smoking cessation with an appointment? Refused     Patient was offered medication to assist with smoking cessation at discharge? Refused  Was education for smoking cessation added to the discharge instructions? Yes    Alcohol/Substance Abuse Discharge Plan:   Does the patient have a history of substance/alcohol abuse and requires a referral for treatment? Yes  Patient referred to the following for substance/alcohol abuse treatment with an appointment? Refused  Patient was offered medication to assist with alcohol cessation at discharge? Refused  Was education for substance/alcohol abuse added to discharge instructions?  Yes    Patient discharged to Home; discussed with patient/caregiver

## 2021-02-11 ENCOUNTER — TELEPHONE (OUTPATIENT)
Dept: FAMILY MEDICINE CLINIC | Age: 31
End: 2021-02-11

## 2021-02-11 NOTE — TELEPHONE ENCOUNTER
Should pt schedule appt here or with psych doctor?    ----- Message from Cecilia Duverney sent at 2/11/2021  3:37 PM EST -----  Regarding: Dr. Carmona Mast: [phone]  General Message/Vendor Calls    Caller's first and last name: Pt.      Reason for call: Needing to schedule f/up for mental health and to discuss Suboxone treatment options.       Callback required yes/no and why:Yes, request.      Best contact number(s):[phone]      Details to clarify the request: N/A      Cecilia Duverney

## 2021-02-11 NOTE — TELEPHONE ENCOUNTER
Spoke with pt   Will need to schedule an appt to discuss treatment options/ hospital follow up  Pt verbalized understanding and had to exit call due to psychiatrist calling on other line per pt's report

## 2021-03-09 ENCOUNTER — OFFICE VISIT (OUTPATIENT)
Dept: FAMILY MEDICINE CLINIC | Age: 31
End: 2021-03-09
Payer: MEDICAID

## 2021-03-09 VITALS
SYSTOLIC BLOOD PRESSURE: 166 MMHG | BODY MASS INDEX: 20.86 KG/M2 | TEMPERATURE: 98.8 F | RESPIRATION RATE: 20 BRPM | HEIGHT: 71 IN | WEIGHT: 149 LBS | OXYGEN SATURATION: 100 % | HEART RATE: 108 BPM | DIASTOLIC BLOOD PRESSURE: 80 MMHG

## 2021-03-09 DIAGNOSIS — F41.0 PANIC ATTACK: ICD-10-CM

## 2021-03-09 DIAGNOSIS — F51.02 ADJUSTMENT INSOMNIA: ICD-10-CM

## 2021-03-09 DIAGNOSIS — F31.0 BIPOLAR AFFECTIVE DISORDER, CURRENT EPISODE HYPOMANIC (HCC): Primary | ICD-10-CM

## 2021-03-09 LAB
AMPHETAMINE QL URINE POC: NEGATIVE
BARBITURATES UR POC: NEGATIVE
BENZODIAZEPINES UR POC: NEGATIVE
COCAINE QL URINE POC: NEGATIVE
LOT EXP DATE POC: NORMAL
LOT NUMBER POC: NORMAL
MARIJUANA (THC) QL URINE POC: NORMAL
MDMA/ECSTASY UR POC: NEGATIVE
METHADONE QL URINE POC: NEGATIVE
METHAMPHETAMINE QL URINE POC: NORMAL
OPIATES 300 QL URINE POC: NORMAL
OXYCODONE UR POC: NEGATIVE
PHENCYCLIDINE QL URINE POC: NEGATIVE
TRICYCLIC ANTIDEPRESSANTS (TCA) QL URINE POC: NEGATIVE

## 2021-03-09 PROCEDURE — 99214 OFFICE O/P EST MOD 30 MIN: CPT | Performed by: STUDENT IN AN ORGANIZED HEALTH CARE EDUCATION/TRAINING PROGRAM

## 2021-03-09 PROCEDURE — 80305 DRUG TEST PRSMV DIR OPT OBS: CPT | Performed by: STUDENT IN AN ORGANIZED HEALTH CARE EDUCATION/TRAINING PROGRAM

## 2021-03-09 RX ORDER — CLONAZEPAM 0.5 MG/1
0.5 TABLET ORAL
Qty: 30 TAB | Refills: 0 | Status: SHIPPED | OUTPATIENT
Start: 2021-03-09 | End: 2021-03-22 | Stop reason: SDUPTHER

## 2021-03-09 RX ORDER — TRAZODONE HYDROCHLORIDE 50 MG/1
50 TABLET ORAL
Qty: 15 TAB | Refills: 1 | Status: SHIPPED | OUTPATIENT
Start: 2021-03-09 | End: 2021-06-16 | Stop reason: SDDI

## 2021-03-09 RX ORDER — BENZTROPINE MESYLATE 0.5 MG/1
0.5 TABLET ORAL
Qty: 60 TAB | Refills: 1 | Status: SHIPPED | OUTPATIENT
Start: 2021-03-09 | End: 2021-10-28

## 2021-03-09 RX ORDER — OLANZAPINE 5 MG/1
5 TABLET ORAL 2 TIMES DAILY
Qty: 60 TAB | Refills: 0 | Status: SHIPPED | OUTPATIENT
Start: 2021-03-09 | End: 2021-06-16 | Stop reason: SDUPTHER

## 2021-03-09 RX ORDER — HYDROXYZINE PAMOATE 25 MG/1
25 CAPSULE ORAL
Qty: 60 CAP | Refills: 0 | Status: SHIPPED | OUTPATIENT
Start: 2021-03-09 | End: 2021-06-16 | Stop reason: SDDI

## 2021-03-09 NOTE — PROGRESS NOTES
1. Have you been to the ER, urgent care clinic since your last visit? Hospitalized since your last visit? No    2. Have you seen or consulted any other health care providers outside of the [address] [place] [address] since your last visit? Include any pap smears or colon screening. Yes- [facility] [place]     Reviewed record in preparation for visit and have necessary documentation  Goals that were addressed and/or need to be completed during or after this appointment include     Health Maintenance Due   Topic Date Due    Hepatitis C Screening  Never done    Pneumococcal 0-64 years (1 of 1 - PPSV23) Never done    COVID-19 Vaccine (1 of 2) Never done    Flu Vaccine (1) Never done       Patient is accompanied by self I have received verbal consent from Ottoniel Alarcon III to discuss any/all medical information while they are present in the room.

## 2021-03-09 NOTE — PROGRESS NOTES
Madhav Brand III (: 1990) is a 35 y.o. male, established patient, here for evaluation of the following chief complaint(s):  Medication Evaluation       ASSESSMENT/PLAN:  1. Bipolar affective disorder, current episode hypomanic [facility])  Assessment & Plan:  Uncontrolled, based on history, physical exam and review of pertinent labs, studies and medications; meds reconciled; changes to treatment plan as per orders. Long discussion with the need for this pt to follow up with Psych. Due to his severe illness and numerous failed medication trials this is no longer in the scope of practice for primary care. We personally made him a repeat psych appt with Amos Apodaca on  at 2:15. Refilled medications given at discharge from inpatient facility for one month only. Also performed UDS. Positive for [facility] which pt admits to and also methamphetamines which the pt denies. Counseled extensively on the need to stop all illicit substances as they are determintal to his mood stability as well as the ability to prescribe controlled substances like Clonazepam.   Orders:  -     OLANZapine (ZyPREXA) 5 mg tablet; Take 1 Tab by mouth two (2) times a day. Indications: bipolar I disorder with most recent episode mixed, Normal, Disp-60 Tab, R-0  -     benztropine (COGENTIN) 0.5 mg tablet; Take 1 Tab by mouth two (2) times daily as needed (for medication induced involentary contractions). , Normal, Disp-60 Tab, R-1  -     clonazePAM (KlonoPIN) 0.5 mg tablet; Take 1 Tab by mouth two (2) times daily as needed for Anxiety. Max Daily Amount: 1 mg., Normal, Disp-30 Tab, R-0  -     AMB POC USCREEN 12 DRUG   2. Panic attack  -     hydrOXYzine pamoate (VISTARIL) 25 mg capsule; Take 1 Cap by mouth three (3) times daily as needed for Anxiety or Agitation. , Normal, Disp-60 Cap, R-0  -     clonazePAM (KlonoPIN) 0.5 mg tablet; Take 1 Tab by mouth two (2) times daily as needed for Anxiety.  Max Daily Amount: 1 mg., Normal, Disp-30 Tab, R-0  -     AMB POC USCREEN 12 DRUG   3. Adjustment insomnia  -     traZODone (DESYREL) 50 mg tablet; Take 1 Tab by mouth nightly as needed for Sleep (For insomnia). Indications: insomnia associated with depression, Normal, Disp-15 Tab, R-1      Return in about 1 month (around 4/9/2021). SUBJECTIVE/OBJECTIVE:  Psychiatric care- Pt was at inpatient Psych for ~1 week in 1/2021 and was discharged on Cogentin, Olanzapine, clonazapam, hydroxyzine, and trazodone. This combination of medications was the best he has ever felt. However he has since run out of them 2wks ago. He missed his scheduled follow up appt made by the hospital. He called them back they would not let him schedule another appt. He present today in an attempt to get med refills. Review of Systems   Constitutional: Negative for fatigue and fever. Neurological: Negative for dizziness and headaches. Psychiatric/Behavioral: Positive for behavioral problems and decreased concentration. Negative for self-injury and suicidal ideas. The patient is nervous/anxious. Visit Vitals  BP (!) 166/80 (BP 1 Location: Left upper arm, BP Patient Position: Sitting, BP Cuff Size: Adult)   Pulse (!) 108   Temp 98.8 °F (37.1 °C) (Temporal)   Resp 20   Ht 5' 11\" (1.803 m)   Wt 149 lb (67.6 kg)   SpO2 100%   BMI 20.78 kg/m²     Physical Exam  Constitutional:       Appearance: Normal appearance. HENT:      Head: Normocephalic and atraumatic. Nose: Nose normal.      Mouth/Throat:      Mouth: Mucous membranes are moist.      Pharynx: Oropharynx is clear. Eyes:      Conjunctiva/sclera: Conjunctivae normal.      Pupils: Pupils are equal, round, and reactive to light. Cardiovascular:      Rate and Rhythm: Normal rate. Pulmonary:      Effort: Pulmonary effort is normal.      Breath sounds: Normal breath sounds. Abdominal:      General: Abdomen is flat. Palpations: Abdomen is soft. Skin:     General: Skin is warm.    Neurological:      General: No focal deficit present. Mental Status: He is alert and oriented to person, place, and time. Current Outpatient Medications   Medication Sig    OLANZapine (ZyPREXA) 5 mg tablet Take 1 Tab by mouth two (2) times a day. Indications: bipolar I disorder with most recent episode mixed    traZODone (DESYREL) 50 mg tablet Take 1 Tab by mouth nightly as needed for Sleep (For insomnia). Indications: insomnia associated with depression    benztropine (COGENTIN) 0.5 mg tablet Take 1 Tab by mouth two (2) times daily as needed (for medication induced involentary contractions).  hydrOXYzine pamoate (VISTARIL) 25 mg capsule Take 1 Cap by mouth three (3) times daily as needed for Anxiety or Agitation.  clonazePAM (KlonoPIN) 0.5 mg tablet Take 1 Tab by mouth two (2) times daily as needed for Anxiety. Max Daily Amount: 1 mg. No current facility-administered medications for this visit. An electronic signature was used to authenticate this note.   -- Vicky Baker MD

## 2021-03-11 NOTE — ASSESSMENT & PLAN NOTE
Uncontrolled, based on history, physical exam and review of pertinent labs, studies and medications; meds reconciled; changes to treatment plan as per orders. Long discussion with the need for this pt to follow up with Psych. Due to his severe illness and numerous failed medication trials this is no longer in the scope of practice for primary care. We personally made him a repeat psych appt with Deion Shaw on April 5th at 2:15. Refilled medications given at discharge from inpatient facility for one month only. Also performed UDS. Positive for [facility] which pt admits to and also methamphetamines which the pt denies.  Counseled extensively on the need to stop all illicit substances as they are determintal to his mood stability as well as the ability to prescribe controlled substances like Clonazepam.

## 2021-03-17 ENCOUNTER — TELEPHONE (OUTPATIENT)
Dept: FAMILY MEDICINE CLINIC | Age: 31
End: 2021-03-17

## 2021-03-17 NOTE — TELEPHONE ENCOUNTER
Attempted to reach patient. unsuccessful with all phone numbers provided in chart.  Would like to scheduled follow up for blood pressure and also confirm patient provided with correct day for psychiatrist appt ( 4/5/2021)

## 2021-03-17 NOTE — PROGRESS NOTES
[address] Medicine Residency Attending Addendum:  Dr. Moody Delvalle MD,  the patient and I were not physically present during this encounter. The resident and I are concurrently monitoring the patient care through appropriate telecommunication technology. I discussed the findings, assessment and plan with the resident and agree with the resident's findings and plan as documented in the resident's note. Clint Tucker MD      Will get close f/u for repeat UDS and BP check.

## 2021-03-22 DIAGNOSIS — F41.0 PANIC ATTACK: ICD-10-CM

## 2021-03-22 DIAGNOSIS — F31.0 BIPOLAR AFFECTIVE DISORDER, CURRENT EPISODE HYPOMANIC (HCC): ICD-10-CM

## 2021-03-22 NOTE — TELEPHONE ENCOUNTER
----- Message from Wendy Galarza sent at 3/22/2021 11:44 AM EDT -----  Regarding: Dr. Bennett Cramer (if not patient): N/A      Relationship of caller (if not patient): N/A      Best contact number(s): [phone]      Name of medication and dosage if known: \"blonazapam\" - 0.5 mg      Is patient out of this medication (yes/no): Yes      Pharmacy name: [address] listed in chart? (yes/no): Yes  Pharmacy phone number: [phone]      Details to clarify the request: Pt needs a 30 day supply and takes two pills daily, so 60 pills total. Pt would like his call returned once the rx is refilled.        Wendy Galarza

## 2021-03-23 ENCOUNTER — OFFICE VISIT (OUTPATIENT)
Dept: FAMILY MEDICINE CLINIC | Age: 31
End: 2021-03-23
Payer: MEDICAID

## 2021-03-23 VITALS
RESPIRATION RATE: 20 BRPM | DIASTOLIC BLOOD PRESSURE: 83 MMHG | OXYGEN SATURATION: 100 % | BODY MASS INDEX: 21.84 KG/M2 | SYSTOLIC BLOOD PRESSURE: 137 MMHG | HEART RATE: 102 BPM | HEIGHT: 71 IN | TEMPERATURE: 97 F | WEIGHT: 156 LBS

## 2021-03-23 DIAGNOSIS — R68.3 CLUBBING OF FINGERS: Primary | ICD-10-CM

## 2021-03-23 DIAGNOSIS — Q25.47 RIGHT AORTIC ARCH: ICD-10-CM

## 2021-03-23 DIAGNOSIS — F19.90 DRUG USE: ICD-10-CM

## 2021-03-23 PROBLEM — N20.0 NEPHROLITHIASIS: Status: ACTIVE | Noted: 2021-03-23

## 2021-03-23 PROCEDURE — 99214 OFFICE O/P EST MOD 30 MIN: CPT | Performed by: STUDENT IN AN ORGANIZED HEALTH CARE EDUCATION/TRAINING PROGRAM

## 2021-03-23 RX ORDER — CLONAZEPAM 0.5 MG/1
0.5 TABLET ORAL
Qty: 30 TAB | Refills: 0 | Status: SHIPPED | OUTPATIENT
Start: 2021-03-23 | End: 2021-06-16 | Stop reason: SDUPTHER

## 2021-03-23 NOTE — PROGRESS NOTES
Yahaira Mota III (: 1990) is a 35 y.o. male, established patient, here for evaluation of the following chief complaint(s):  Medication Evaluation       ASSESSMENT/PLAN:  1. Clubbing of fingers  Comments:  Likely multifactorial cause, however given significant bilateral clubbing will proceed with pulmonary, hepatic and hematologic etiologies. Orders:  -     XR CHEST PA LAT; Future  -     METABOLIC PANEL, COMPREHENSIVE; Future  -     CBC WITH AUTOMATED DIFF; Future  -     TSH 3RD GENERATION; Future  -     HIV 1/2 AG/AB, 4TH GENERATION,W RFLX CONFIRM; Future  -     RPR; Future  -     HEPATITIS PANEL, ACUTE; Future  2. Drug use  Comments:  Past h/o IV drug use without known negative infectious workup. Will complete basic workup today as they may be contributing to his primary diagnosis. Orders:  -     HIV 1/2 AG/AB, 4TH GENERATION,W RFLX CONFIRM; Future  -     RPR; Future  -     HEPATITIS PANEL, ACUTE; Future  3. Right aortic arch  Assessment & Plan:  Incidentally found on CXR today and upon history review also present on CT chest . R sided aorta is associated with congenital heart malformations and vascular ring. Will get echo to further characterize   Orders:  -     ECHO ADULT COMPLETE; Future      Return in about 10 days (around 2021) for scheduled appt with Dr. Idalmis Chandler. .      SUBJECTIVE/OBJECTIVE:  Clubbing of fingers- Clubbing was pointed out to the pt about a year ago and he is unsure if it was present before that. However he was sure it was not there ~5yrs ago. Recently been smoking less, ~1ppd. No known h/o COPD, emphysema, lung disease or anemia. H/o IV drug use- Would like to be tested for sexually transmitted disease. Review of Systems   Constitutional: Negative for chills and fever. Respiratory: Negative for cough and shortness of breath. Gastrointestinal: Negative for nausea and vomiting. Genitourinary: Negative for dysuria and frequency.    Neurological: Negative for dizziness and headaches. Visit Vitals  /83 (BP 1 Location: Left upper arm, BP Patient Position: Sitting, BP Cuff Size: Adult)   Pulse (!) 102   Temp 97 °F (36.1 °C) (Oral)   Resp 20   Ht 5' 11\" (1.803 m)   Wt 156 lb (70.8 kg)   SpO2 100%   BMI 21.76 kg/m²     Physical Exam  Constitutional:       Appearance: Normal appearance. HENT:      Head: Normocephalic and atraumatic. Nose: Nose normal.      Mouth/Throat:      Mouth: Mucous membranes are moist.      Pharynx: Oropharynx is clear. Eyes:      Conjunctiva/sclera: Conjunctivae normal.      Pupils: Pupils are equal, round, and reactive to light. Cardiovascular:      Rate and Rhythm: Normal rate and regular rhythm. Pulmonary:      Effort: Pulmonary effort is normal.      Comments: Good air movement bilateral lung fields. Mild fine crackles noted bilaterally  Abdominal:      General: Abdomen is flat. Palpations: Abdomen is soft. Musculoskeletal:      Comments: Significant bilateral clubbing of all distal digits. Swelling of DIPs. No discoloration of nails. Skin:     General: Skin is warm. Neurological:      General: No focal deficit present. Mental Status: He is alert and oriented to person, place, and time. Current Outpatient Medications   Medication Sig    clonazePAM (KlonoPIN) 0.5 mg tablet Take 1 Tab by mouth two (2) times daily as needed for Anxiety. Max Daily Amount: 1 mg.    OLANZapine (ZyPREXA) 5 mg tablet Take 1 Tab by mouth two (2) times a day. Indications: bipolar I disorder with most recent episode mixed    traZODone (DESYREL) 50 mg tablet Take 1 Tab by mouth nightly as needed for Sleep (For insomnia). Indications: insomnia associated with depression    benztropine (COGENTIN) 0.5 mg tablet Take 1 Tab by mouth two (2) times daily as needed (for medication induced involentary contractions).     hydrOXYzine pamoate (VISTARIL) 25 mg capsule Take 1 Cap by mouth three (3) times daily as needed for Anxiety or Agitation. No current facility-administered medications for this visit. An electronic signature was used to authenticate this note.   -- Natasha Bay MD

## 2021-03-23 NOTE — TELEPHONE ENCOUNTER
----- Message from Ness Cifuentes sent at 3/23/2021 11:05 AM EDT -----  Regarding: /Telephone  Patient return call    Caller's first and last name and relationship (if not the patient):      Best contact number(s): [phone]      Whose call is being returned: unknown       Details to clarify the request:N/A       Ness Cifuentes

## 2021-03-23 NOTE — H&P
[facility] HISTORY AND PHYSICAL    Name:  Gema Antonio  MR#:  [id number]  :  1990  ACCOUNT #:  [de-identified]  ADMIT DATE:  2021    This is a 80-year-old male patient admitted to [facility] from [facility] ED.    CHIEF COMPLAINT:  \"Will go to the hospital; if not, go to snf. \"    HISTORY OF PRESENT ILLNESS:  The patient is depressed, suicidal statements. The patient denied suicidal or homicidal ideation, but little insight, poor judgment, something related with the stolen Klonopin today, invasive. Denies acting out. Denies threatening to harm himself or others. He has an appointment with the private psychiatrist tomorrow. Dad called 911 and prescription was stolen, and the patient says because his dad knows how he gets when he is off medication. The patient grievingas  His mom passed away in . He is still struggling with grief, but also, he lives with an elderly father. Also father dying from some disease. PAST HISTORY:  Apparently, he put a gun in the past and emptied filled bottles. 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 Father felt that the patient gets mad around him, so the patient can be sick and deceiving. MENTAL STATUS:  Alert, verbal, oriented, but poor insight, poor judgment. Denies suicidal or homicidal thoughts. No hallucinations, but acknowledges depression, racing thoughts. Denies auditory or visual hallucinations. Memory recall is fair. IQ about average. ALLERGIES:  HE IS ALLERGIC TO SULFA ANTIBIOTICS. DIAGNOSES:  Major depression, recurrent, acute, severe, without psychosis and anxiety disorder. Bipolar disorder, mixed, with anxiety disorder. DISPOSITION:  The patient needs an inpatient level of care.   I placed him on olanzapine 5 mg b.i.d., trazodone 600 mg p.r.n., Nicoderm patch 21 mg daily, Cogentin 0.5 mg b.i.d. p.r.n., clonazepam 0.5 mg b.i.d. p.r.n. LENGTH OF STAY:  Five to seven days. CRITERIA FOR DISCHARGE:  Free of suicidal or homicidal thought, free of mood swings, medication compliance.     AFTERCARE/FOLLOWUP:  Followup with his regular psychiatrist.        Cat Martin MD      [id number]  D:  03/22/2021 23:52  T:  03/23/2021 5:33  JOB #:  [id number]

## 2021-03-26 PROBLEM — Q25.47 RIGHT AORTIC ARCH: Status: ACTIVE | Noted: 2021-03-26

## 2021-04-02 LAB
ALBUMIN SERPL-MCNC: 4 G/DL (ref 3.5–5)
ALBUMIN/GLOB SERPL: 1.3 {RATIO} (ref 1.1–2.2)
ALP SERPL-CCNC: 69 U/L (ref 45–117)
ALT SERPL-CCNC: 20 U/L (ref 12–78)
ANION GAP SERPL CALC-SCNC: 5 MMOL/L (ref 5–15)
AST SERPL-CCNC: 10 U/L (ref 15–37)
BASOPHILS # BLD: 0.1 K/UL (ref 0–0.1)
BASOPHILS NFR BLD: 1 % (ref 0–1)
BILIRUB SERPL-MCNC: 0.3 MG/DL (ref 0.2–1)
BUN SERPL-MCNC: 11 MG/DL (ref 6–20)
BUN/CREAT SERPL: 11 (ref 12–20)
CALCIUM SERPL-MCNC: 9.6 MG/DL (ref 8.5–10.1)
CHLORIDE SERPL-SCNC: 105 MMOL/L (ref 97–108)
CO2 SERPL-SCNC: 30 MMOL/L (ref 21–32)
CREAT SERPL-MCNC: 0.99 MG/DL (ref 0.7–1.3)
DIFFERENTIAL METHOD BLD: NORMAL
EOSINOPHIL # BLD: 0.4 K/UL (ref 0–0.4)
EOSINOPHIL NFR BLD: 4 % (ref 0–7)
ERYTHROCYTE [DISTWIDTH] IN BLOOD BY AUTOMATED COUNT: 12.3 % (ref 11.5–14.5)
GLOBULIN SER CALC-MCNC: 3.2 G/DL (ref 2–4)
GLUCOSE SERPL-MCNC: 82 MG/DL (ref 65–100)
HAV IGM SER QL: NONREACTIVE
HBV CORE IGM SER QL: NONREACTIVE
HBV SURFACE AG SER QL: <0.1 INDEX
HBV SURFACE AG SER QL: NEGATIVE
HCT VFR BLD AUTO: 44.3 % (ref 36.6–50.3)
HCV AB SER IA-ACNC: >11 INDEX
HCV AB SERPL QL IA: REACTIVE
HCV COMMENT,HCGAC: ABNORMAL
HGB BLD-MCNC: 14.5 G/DL (ref 12.1–17)
HIV 1+2 AB+HIV1 P24 AG SERPL QL IA: NONREACTIVE
HIV12 RESULT COMMENT, HHIVC: NORMAL
IMM GRANULOCYTES # BLD AUTO: 0 K/UL (ref 0–0.04)
IMM GRANULOCYTES NFR BLD AUTO: 0 % (ref 0–0.5)
LYMPHOCYTES # BLD: 2.8 K/UL (ref 0.8–3.5)
LYMPHOCYTES NFR BLD: 26 % (ref 12–49)
MCH RBC QN AUTO: 30.9 PG (ref 26–34)
MCHC RBC AUTO-ENTMCNC: 32.7 G/DL (ref 30–36.5)
MCV RBC AUTO: 94.5 FL (ref 80–99)
MONOCYTES # BLD: 0.7 K/UL (ref 0–1)
MONOCYTES NFR BLD: 7 % (ref 5–13)
NEUTS SEG # BLD: 6.7 K/UL (ref 1.8–8)
NEUTS SEG NFR BLD: 62 % (ref 32–75)
NRBC # BLD: 0 K/UL (ref 0–0.01)
NRBC BLD-RTO: 0 PER 100 WBC
PLATELET # BLD AUTO: 211 K/UL (ref 150–400)
PMV BLD AUTO: 11.6 FL (ref 8.9–12.9)
POTASSIUM SERPL-SCNC: 4.3 MMOL/L (ref 3.5–5.1)
PROT SERPL-MCNC: 7.2 G/DL (ref 6.4–8.2)
RBC # BLD AUTO: 4.69 M/UL (ref 4.1–5.7)
RPR SER QL: NONREACTIVE
SODIUM SERPL-SCNC: 140 MMOL/L (ref 136–145)
SP1: ABNORMAL
SP2: ABNORMAL
SP3: ABNORMAL
TSH SERPL DL<=0.05 MIU/L-ACNC: 3.13 UIU/ML (ref 0.36–3.74)
WBC # BLD AUTO: 10.8 K/UL (ref 4.1–11.1)

## 2021-04-08 ENCOUNTER — TELEPHONE (OUTPATIENT)
Dept: FAMILY MEDICINE CLINIC | Age: 31
End: 2021-04-08

## 2021-04-08 NOTE — TELEPHONE ENCOUNTER
Called pt regarding recent lab work. He made it to his psych appt! Great news! They adjusted his medications slightly, but overall a good appt. Pt concerned about skin lesions, potentially chigger +/- poison ivy as he was outside in the woods. Lesions on his face. Will schedule in person appt for evaluation. Discussed Positive Hep C Ab result. Discussed we don't know if this is a cleared infection or current. Currently nml LFTs. Will come in for more testing. Pt states he may have contracted this during his IV drug use >2yrs ago or intercourse with a Hep C female in 12/2020. Message sent to [employer] for help with scheduling appts.

## 2021-06-16 ENCOUNTER — OFFICE VISIT (OUTPATIENT)
Dept: FAMILY MEDICINE CLINIC | Age: 31
End: 2021-06-16
Payer: MEDICAID

## 2021-06-16 VITALS
RESPIRATION RATE: 18 BRPM | OXYGEN SATURATION: 100 % | WEIGHT: 134.6 LBS | HEIGHT: 71 IN | TEMPERATURE: 97.2 F | SYSTOLIC BLOOD PRESSURE: 134 MMHG | HEART RATE: 104 BPM | DIASTOLIC BLOOD PRESSURE: 85 MMHG | BODY MASS INDEX: 18.84 KG/M2

## 2021-06-16 DIAGNOSIS — F31.0 BIPOLAR AFFECTIVE DISORDER, CURRENT EPISODE HYPOMANIC (HCC): Primary | ICD-10-CM

## 2021-06-16 DIAGNOSIS — F41.0 PANIC ATTACK: ICD-10-CM

## 2021-06-16 DIAGNOSIS — R76.8 POSITIVE HEPATITIS C ANTIBODY TEST: ICD-10-CM

## 2021-06-16 PROCEDURE — 99214 OFFICE O/P EST MOD 30 MIN: CPT | Performed by: FAMILY MEDICINE

## 2021-06-16 RX ORDER — DIVALPROEX SODIUM 250 MG/1
TABLET, DELAYED RELEASE ORAL
COMMUNITY
Start: 2021-06-01 | End: 2021-10-28

## 2021-06-16 RX ORDER — CLONAZEPAM 0.5 MG/1
0.5 TABLET ORAL
Qty: 60 TABLET | Refills: 0 | Status: SHIPPED | OUTPATIENT
Start: 2021-06-16 | End: 2021-07-16

## 2021-06-16 RX ORDER — OLANZAPINE 5 MG/1
5 TABLET ORAL 2 TIMES DAILY
Qty: 60 TABLET | Refills: 0 | Status: SHIPPED | OUTPATIENT
Start: 2021-06-16 | End: 2021-10-28

## 2021-06-16 NOTE — PROGRESS NOTES
[facility]    History of Present Illness:   Charis Maldonado III is a 35 y.o. male with history of Hepatitis C, Bipolar, Psychosis, Depression, SI, Polysubstance abuse  CC: Multiple issues. History provided by patient and Records    HPI:  Hepatitis C: Patient with a recent positive Hepatitis C testing and has not had follow up with Hepatology Scheduled yet. Schizophrenia: Patient with uncontrolled Bipolar disorder. Did see Psychiatry once already but missed last appointment and reports he threw out all of his medications. Noting mildly manic, and constant worrying at night at this time as well. Patient reports that his father is very ill and will soon pass. Patient denies any SI at this time. Reports having issues with follow up. Per records diagnosed with Paranoid schizophrenia and Psychosis as opposed to Bipolar disorder. Feels that the Klonopin is not helpful nor is Trazodone. Not exactly sure on medications. Noting some anxiety with working. Health Maintenance  Health Maintenance Due   Topic Date Due    Pneumococcal 0-64 years (1 of 2 - PPSV23) Never done    COVID-19 Vaccine (1) Never done       Past Medical, Family, and Social History:     Current Outpatient Medications on File Prior to Visit   Medication Sig Dispense Refill    divalproex DR (DEPAKOTE) 250 mg tablet TAKE 1 TABLET BY MOUTH TWICE DAILY      [DISCONTINUED] clonazePAM (KlonoPIN) 0.5 mg tablet Take 1 Tab by mouth two (2) times daily as needed for Anxiety. Max Daily Amount: 1 mg. (Patient not taking: Reported on 6/16/2021) 30 Tab 0    benztropine (COGENTIN) 0.5 mg tablet Take 1 Tab by mouth two (2) times daily as needed (for medication induced involentary contractions). (Patient not taking: Reported on 6/16/2021) 60 Tab 1    [DISCONTINUED] OLANZapine (ZyPREXA) 5 mg tablet Take 1 Tab by mouth two (2) times a day.  Indications: bipolar I disorder with most recent episode mixed (Patient not taking: Reported on 6/16/2021) 60 Tab 0    [DISCONTINUED] traZODone (DESYREL) 50 mg tablet Take 1 Tab by mouth nightly as needed for Sleep (For insomnia). Indications: insomnia associated with depression (Patient not taking: Reported on 6/16/2021) 15 Tab 1    [DISCONTINUED] hydrOXYzine pamoate (VISTARIL) 25 mg capsule Take 1 Cap by mouth three (3) times daily as needed for Anxiety or Agitation. (Patient not taking: Reported on 6/16/2021) 60 Cap 0     No current facility-administered medications on file prior to visit. Patient Active Problem List   Diagnosis Code    Psychosis NOS F29    Polysubstance dependence ([facility] F19.20    Anxiety state, unspecified F41.1    Esophageal reflux K21.9    Bipolar affective (HCC) F31.9    Depression F32.9    Panic attack F41.0    Suicidal ideation R45.851    Nephrolithiasis N20.0    Right aortic arch Q25.47       Social History     Socioeconomic History    Marital status: SINGLE     Spouse name: Not on file    Number of children: Not on file    Years of education: Not on file    Highest education level: Not on file   Occupational History    Not on file   Tobacco Use    Smoking status: Current Every Day Smoker     Packs/day: 1.00    Smokeless tobacco: Former User   Substance and Sexual Activity    Alcohol use: No    Drug use: Yes     Types: Marijuana, Heroin    Sexual activity: Yes     Partners: Female     Birth control/protection: None   Other Topics Concern    Not on file   Social History Narrative    Not on file     Social Determinants of Health     Financial Resource Strain:     Difficulty of Paying Living Expenses:    Food Insecurity:     Worried About Running Out of Food in the Last Year:     Ran Out of Food in the Last Year:    Transportation Needs:     Lack of Transportation (Medical):      Lack of Transportation (Non-Medical):    Physical Activity:     Days of Exercise per Week:     Minutes of Exercise per Session:    Stress:     Feeling of Stress :    Social Connections:     Frequency of Communication with Friends and Family:     Frequency of Social Gatherings with Friends and Family:     Attends Muslim Services:     Active Member of Clubs or Organizations:     Attends Club or Organization Meetings:     Marital Status:    Intimate Partner Violence:     Fear of Current or Ex-Partner:     Emotionally Abused:     Physically Abused:     Sexually Abused:        Review of Systems   Review of Systems   Constitutional: Positive for malaise/fatigue. Negative for chills and fever. Psychiatric/Behavioral: Positive for depression. Negative for suicidal ideas. The patient is nervous/anxious and has insomnia. Objective:     Visit Vitals  /85 (BP 1 Location: Left arm, BP Patient Position: Sitting, BP Cuff Size: Adult)   Pulse (!) 104   Temp 97.2 °F (36.2 °C) (Oral)   Resp 18   Ht 5' 11\" (1.803 m)   Wt 134 lb 9.6 oz (61.1 kg)   SpO2 100%   BMI 18.77 kg/m²        Physical Exam  Vitals and nursing note reviewed. Constitutional:       Appearance: Normal appearance. HENT:      Head: Normocephalic and atraumatic. Cardiovascular:      Rate and Rhythm: Normal rate and regular rhythm. Pulses: Normal pulses. Heart sounds: Normal heart sounds. No murmur heard. No friction rub. No gallop. Pulmonary:      Effort: Pulmonary effort is normal.      Breath sounds: Normal breath sounds. Abdominal:      General: Abdomen is flat. Bowel sounds are normal.      Palpations: Abdomen is soft. Musculoskeletal:         General: Normal range of motion. Cervical back: Normal range of motion and neck supple. Neurological:      Mental Status: He is alert. Pertinent Labs/Studies:      Assessment and orders:       ICD-10-CM ICD-9-CM    1. Bipolar affective disorder, [facility] episode [facility]  F31.0 296.40 OLANZapine (ZyPREXA) 5 mg tablet      clonazePAM (KlonoPIN) 0.5 mg tablet   2.  Positive hepatitis C antibody test  R76.8 795.79 REFERRAL TO LIVER HEPATOLOGY   3. Panic attack  F41.0 300.01 clonazePAM (KlonoPIN) 0.5 mg tablet     Diagnoses and all orders for this visit:    1. Bipolar affective disorder, current episode hypomanic [facility]): Patient with likely Manic/Hypomanic episode at this time, significant home stressors noted. Reviewed available records and Psych notes. Patient is poor historian on medications actually taken so will start with Zyprexa BID from hospital (was once daily from psychiatry). Spoke with pharmacy and last fills Clonazepam 0.5 mg BID, Depakote 250 mg BID, Benzotropine 0.5 mg BID, Olanzapine 10 mg daily. Patient had not filled Trazodone or Hydroxyzine since March. Patient needs to follow ith Psychiatry in the future, but attempting to to get restarted. -     OLANZapine (ZyPREXA) 5 mg tablet; Take 1 Tablet by mouth two (2) times a day. Indications: bipolar I disorder with most recent episode mixed  -     clonazePAM (KlonoPIN) 0.5 mg tablet; Take 1 Tablet by mouth two (2) times daily as needed for Anxiety. Max Daily Amount: 1 mg.    2. Positive hepatitis C antibody test: Needs to see Hepatology, [facility], can get labs completed here if needed. -     REFERRAL TO LIVER HEPATOLOGY    3. Panic attack  -     clonazePAM (KlonoPIN) 0.5 mg tablet; Take 1 Tablet by mouth two (2) times daily as needed for Anxiety. Max Daily Amount: 1 mg. Follow-up and Dispositions    · Return in about 2 weeks (around 6/30/2021). I have discussed the diagnosis with the patient and the intended plan as seen in the above orders. Social history, medical history, and labs were reviewed. The patient has received an after-visit summary and questions were answered concerning future plans. I have discussed medication side effects and warnings with the patient as well.     MD LYNN Morgan & [facility]  06/16/21

## 2021-06-16 NOTE — PROGRESS NOTES
Chief Complaint   Patient presents with    Follow Up Chronic Condition     Visit Vitals  /85 (BP 1 Location: Left arm, BP Patient Position: Sitting, BP Cuff Size: Adult)   Pulse (!) 104   Temp 97.2 °F (36.2 °C) (Oral)   Resp 18   Ht 5' 11\" (1.803 m)   Wt 134 lb 9.6 oz (61.1 kg)   SpO2 100%   BMI 18.77 kg/m²     1. Have you been to the ER, urgent care clinic since your last visit? Hospitalized since your last visit? No    2. Have you seen or consulted any other health care providers outside of the [address] [place] [address] since your last visit? Include any pap smears or colon screening.  No    Reviewed record in preparation for visit and have necessary documentation  Pt did not bring medication to office visit for review  opportunity was given for questions  Goals that were addressed and/or need to be completed during or after this appointment include   Health Maintenance Due   Topic Date Due    Pneumococcal 0-64 years (1 of 2 - PPSV23) Never done    COVID-19 Vaccine (1) Never done

## 2021-08-16 DIAGNOSIS — F41.0 PANIC ATTACK: ICD-10-CM

## 2021-08-16 DIAGNOSIS — F31.0 BIPOLAR AFFECTIVE DISORDER, CURRENT EPISODE HYPOMANIC (HCC): ICD-10-CM

## 2021-08-16 RX ORDER — CLONAZEPAM 0.5 MG/1
0.5 TABLET ORAL
Qty: 30 TABLET | Refills: 0 | Status: SHIPPED | OUTPATIENT
Start: 2021-08-16 | End: 2021-09-10 | Stop reason: SDUPTHER

## 2021-09-10 DIAGNOSIS — F31.0 BIPOLAR AFFECTIVE DISORDER, CURRENT EPISODE HYPOMANIC (HCC): ICD-10-CM

## 2021-09-10 DIAGNOSIS — F41.0 PANIC ATTACK: ICD-10-CM

## 2021-09-10 RX ORDER — CLONAZEPAM 0.5 MG/1
0.5 TABLET ORAL
Qty: 30 TABLET | Refills: 0 | Status: SHIPPED | OUTPATIENT
Start: 2021-09-10 | End: 2021-10-05

## 2021-10-26 NOTE — GROUP NOTE
Detail Level: Zone [facility] GROUP DOCUMENTATION INDIVIDUAL Group Therapy Note Date: 1/13/2021 Group Start Time: 1300 Group End Time: 4256 Group Topic: Process Group - Inpatient SRM 2 BH NON ACUTE Soha Rad [facility] GROUP DOCUMENTATION GROUP Group Therapy Note Attendees: 9 out of 14 
 
1pm Process Group Patients were encouraged to discuss their thoughts, feelings, and emotions and overall how they are doing in general. Today's group focused on the patients' goal for today and what they felt that their overall mood has been today. Lastly, patients were encouraged to listen to and be supportive of their peers. Attendance: Attended Patient's Goal:  Develop coping skills Interventions/techniques: Validated, Promoted peer support, Provide feedback and Supported Follows Directions: Followed directions Interactions: Interacted appropriately Mental Status: Other Slept for most of group meeting Behavior/appearance: Withdrawn/quiet Goals Achieved: Able to self-disclose Additional Notes:  Pt spoke only at the very beginning of group, where he stated that his goal for today is \"to go home,\" pt then fell asleep in group despite several attempts from the facilitator to rouse him. [place] Patient Specific Counseling (Will Not Stick From Patient To Patient): \\Rhoda's rash seems to have some features of rosacea, which is what she was initially told she has.  We discussed that this does not account for all her severe itch, however I recommend trial of a low dose minocycline in hopes it may reduce some of the symptoms she is having including facial flushing and burning. Patient Specific Counseling (Will Not Stick From Patient To Patient): \\nPatient has been on benadryl, prednisone, topical steroids, ketoconazole, and hydroxyzine and states she gets absolutely no relief from these.  She is so itchy she can't sleep.  We discussed possible trial of doxepin, however she is currently taking trazadone, citalopram and hydroxyzine at bedtime and I do not feel comfortable adding that.  I advised her to discuss with her PCP, who she will see in a few days.  She will also inquire about a referral to an allergist.

## 2021-10-28 ENCOUNTER — OFFICE VISIT (OUTPATIENT)
Dept: FAMILY MEDICINE CLINIC | Age: 31
End: 2021-10-28
Payer: MEDICAID

## 2021-10-28 VITALS
BODY MASS INDEX: 21 KG/M2 | SYSTOLIC BLOOD PRESSURE: 155 MMHG | HEART RATE: 105 BPM | DIASTOLIC BLOOD PRESSURE: 80 MMHG | HEIGHT: 71 IN | TEMPERATURE: 97.9 F | WEIGHT: 150 LBS | RESPIRATION RATE: 20 BRPM | OXYGEN SATURATION: 100 %

## 2021-10-28 DIAGNOSIS — E61.1 IRON DEFICIENCY: ICD-10-CM

## 2021-10-28 DIAGNOSIS — Z11.3 SCREENING FOR STD (SEXUALLY TRANSMITTED DISEASE): ICD-10-CM

## 2021-10-28 DIAGNOSIS — R68.3 CLUBBING OF NAIL: ICD-10-CM

## 2021-10-28 DIAGNOSIS — Z00.00 VISIT FOR WELL MAN HEALTH CHECK: Primary | ICD-10-CM

## 2021-10-28 DIAGNOSIS — R21 SKIN RASH: ICD-10-CM

## 2021-10-28 DIAGNOSIS — F31.0 BIPOLAR AFFECTIVE DISORDER, CURRENT EPISODE HYPOMANIC (HCC): ICD-10-CM

## 2021-10-28 DIAGNOSIS — F19.20 POLYSUBSTANCE DEPENDENCE (HCC): ICD-10-CM

## 2021-10-28 PROCEDURE — 99395 PREV VISIT EST AGE 18-39: CPT | Performed by: FAMILY MEDICINE

## 2021-10-28 RX ORDER — FLUOXETINE HYDROCHLORIDE 20 MG/1
20 CAPSULE ORAL DAILY
COMMUNITY
Start: 2021-10-10 | End: 2021-12-22 | Stop reason: SDDI

## 2021-10-28 RX ORDER — DIAPER,BRIEF,INFANT-TODD,DISP
EACH MISCELLANEOUS 2 TIMES DAILY
Qty: 30 G | Refills: 0 | Status: SHIPPED | OUTPATIENT
Start: 2021-10-28 | End: 2021-12-22

## 2021-10-28 RX ORDER — DOXYCYCLINE 100 MG/1
100 CAPSULE ORAL 2 TIMES DAILY
Qty: 14 CAPSULE | Refills: 0 | Status: SHIPPED | OUTPATIENT
Start: 2021-10-28 | End: 2021-11-04

## 2021-10-28 RX ORDER — CLONAZEPAM 1 MG/1
1 TABLET ORAL DAILY
COMMUNITY
Start: 2021-10-03 | End: 2022-02-20

## 2021-10-28 RX ORDER — OLANZAPINE 20 MG/1
20 TABLET ORAL DAILY
COMMUNITY
Start: 2021-10-06 | End: 2021-12-22 | Stop reason: SDDI

## 2021-10-28 NOTE — PROGRESS NOTES
1. Have you been to the ER, urgent care clinic since your last visit? Hospitalized since your last visit? No    2. Have you seen or consulted any other health care providers outside of the [address] [place] [address] since your last visit? Include any pap smears or colon screening. No    Reviewed record in preparation for visit and have necessary documentation  Goals that were addressed and/or need to be completed during or after this appointment include     Health Maintenance Due   Topic Date Due    Pneumococcal 0-64 years (1 of 2 - PPSV23) Never done    COVID-19 Vaccine (1) Never done    Flu Vaccine (1) Never done       Patient is accompanied by self I have received verbal consent from Mehdi Higgins III to discuss any/all medical information while they are present in the room.

## 2021-10-28 NOTE — PROGRESS NOTES
[facility]    History of Present Illness:   Kumar Albrecht III is a 35 y.o. male with history of Bipolar Affective Disorder, Polysubstance abuse. CC: Yearly Physical  History provided by patient and Records    HPI:  Well Male exam:  Kumar Albrecht III is a 35 y.o. Male presenting for well male exam.     How would you rate your health in generally over the last year? Fair  Has your physical and emotional health limited your social activities with family or friends? yes    Bipolar: Speaking to Maurice Gonzalez for Bipolar now. Taking Clonezepam 1 mg in the day 0.5 mg nightly, Prozac 20 mg, Olanzapine 20 mg. Patient does endorse taking Opioids without prescription. Feels multiple stressors present as well with baby on the way as well. Rash: Noting open sores on the face and hands that started while in [place]. Concerned about infection and possible bug bites. Tried multiple creams including hydrogen Peroxide, and calamine lotion. Nail Abnormalities: Chronic issues present. Taking Iron and Niacin now. Sexual History:  Sexually active: Yes  Number of sexual partners: 1  Type of sexual partners: Female  Method of family planning: None    Exercise: Patient does not have structured exercise  - Does patient get 150 minutes of structured exercise weekly? No  - Type of work patient has? Moderate to heavy work  - Limitations to exercise? None    Healthcare maintenance:   Health Maintenance Due   Topic Date Due    Pneumococcal 0-64 years (1 of 2 - PPSV23) Never done    COVID-19 Vaccine (1) Never done    Flu Vaccine (1) Never done     Colonoscopy indicated? No   DEXA scan indicated? No   HIV/STI testing indicated? Yes  Hepatitis C testing indicated? No   Lung cancer screening indicated? No   AAA screening indicated? No     Immunization History   Administered Date(s) Administered    TD Vaccine 05/20/2011    Tdap 07/19/2015     Flu indicated? Yes  Tdap indicated? Yes  Pneumovax indicated?  No Zostervax indicated? No   Meningococcal indicated? No      Past Medical, Family, and Social History:     Current Outpatient Medications on File Prior to Visit   Medication Sig Dispense Refill    FLUoxetine (PROzac) 20 mg capsule Take 20 mg by mouth daily.  clonazePAM (KlonoPIN) 1 mg tablet Take 1 mg by mouth daily.  OLANZapine (ZyPREXA) 20 mg tablet Take 20 mg by mouth daily.  clonazePAM (KlonoPIN) 0.5 mg tablet TAKE 1 TABLET BY MOUTH TWICE DAILY AS NEEDED FOR ANXIETY MAX  DAILY  AMOUNT  1MG. PATIENT  NEEDS  FOLLOW  UP  APPT WITH PSYCHIATRY 30 Tablet 0    [DISCONTINUED] divalproex DR (DEPAKOTE) 250 mg tablet TAKE 1 TABLET BY MOUTH TWICE DAILY (Patient not taking: Reported on 10/28/2021)      [DISCONTINUED] OLANZapine (ZyPREXA) 5 mg tablet Take 1 Tablet by mouth two (2) times a day. Indications: bipolar I disorder with most recent episode mixed 60 Tablet 0    [DISCONTINUED] benztropine (COGENTIN) 0.5 mg tablet Take 1 Tab by mouth two (2) times daily as needed (for medication induced involentary contractions). (Patient not taking: Reported on 6/16/2021) 60 Tab 1     No current facility-administered medications on file prior to visit. Patient Active Problem List   Diagnosis Code    Psychosis NOS F29    Polysubstance dependence ([facility] F19.20    Anxiety state, unspecified F41.1    Esophageal reflux K21.9    Bipolar affective (HCC) F31.9    Depression F32. A    Panic attack F41.0    Suicidal ideation R45.851    Nephrolithiasis N20.0    Right aortic arch Q25.47       Social History     Socioeconomic History    Marital status: SINGLE     Spouse name: Not on file    Number of children: Not on file    Years of education: Not on file    Highest education level: Not on file   Occupational History    Not on file   Tobacco Use    Smoking status: Current Every Day Smoker     Packs/day: 1.00    Smokeless tobacco: Former User   Substance and Sexual Activity    Alcohol use: No    Drug use: Yes     Types: Marijuana, Heroin    Sexual activity: Yes     Partners: Female     Birth control/protection: None   Other Topics Concern    Not on file   Social History Narrative    Not on file     Social Determinants of Health     Financial Resource Strain:     Difficulty of Paying Living Expenses:    Food Insecurity:     Worried About Running Out of Food in the Last Year:     [address] in the Last Year:    Transportation Needs:     Lack of Transportation (Medical):  Lack of Transportation (Non-Medical):    Physical Activity:     Days of Exercise per Week:     Minutes of Exercise per Session:    Stress:     Feeling of Stress :    Social Connections:     Frequency of Communication with Friends and Family:     Frequency of Social Gatherings with Friends and Family:     Attends Restorationist Services:     Active Member of Clubs or Organizations:     Attends Club or Organization Meetings:     Marital Status:    Intimate Partner Violence:     Fear of Current or Ex-Partner:     Emotionally Abused:     Physically Abused:     Sexually Abused:        Review of Systems   Review of Systems   Constitutional: Negative for chills and fever. Gastrointestinal: Negative for abdominal pain, nausea and vomiting. Skin: Positive for rash. Psychiatric/Behavioral: The patient is nervous/anxious and has insomnia. Objective:     Visit Vitals  BP (!) 155/80 (BP 1 Location: Right arm, BP Patient Position: Sitting, BP Cuff Size: Adult)   Pulse (!) 105   Temp 97.9 °F (36.6 °C) (Oral)   Resp 20   Ht 5' 11\" (1.803 m)   Wt 150 lb (68 kg)   SpO2 100%   BMI 20.92 kg/m²        Physical Exam  Vitals and nursing note reviewed. Constitutional:       Appearance: Normal appearance. HENT:      Head: Normocephalic and atraumatic. Cardiovascular:      Rate and Rhythm: Normal rate and regular rhythm. Pulses: Normal pulses. Heart sounds: Normal heart sounds. No murmur heard. No friction rub. No gallop. Pulmonary:      Effort: Pulmonary effort is normal.      Breath sounds: Normal breath sounds. Abdominal:      General: Abdomen is flat. Bowel sounds are normal.      Palpations: Abdomen is soft. Musculoskeletal:      Cervical back: Normal range of motion and neck supple. Skin:     Comments: SOres on face bilaterally and arms. Scabbed, heraling slowly   Neurological:      Mental Status: He is alert. Pertinent Labs/Studies:      Assessment and orders:       ICD-10-CM ICD-9-CM    1. Visit for well man health check  Z00.00 V70.0    2. Polysubstance dependence ([facility]  F19.20 304.80    3. Bipolar affective disorder, current episode hypomanic ([facility]  F31.0 296.40 CBC W/O DIFF      METABOLIC PANEL, COMPREHENSIVE      METABOLIC PANEL, COMPREHENSIVE      CBC W/O DIFF      CANCELED: DRUG SCREEN, URINE      CANCELED: COMPLIANCE DRUG SCREEN/PRESCRIPTION MONITORING   4. Skin rash  R21 782.1    5. Iron deficiency  E61.1 280.9 IRON PROFILE      FERRITIN      FERRITIN      IRON PROFILE   6. Clubbing of nail  R68.3 703.8 ZINC      VITAMIN B3/NICOTINIC ACID      VITAMIN B3/NICOTINIC ACID      ZINC   7. Screening for STD (sexually transmitted disease)  Z11.3 V74.5 HIV 1/2 AG/AB, 4TH GENERATION,W RFLX CONFIRM      RPR      CHLAMYDIA / GC-AMPLIFIED      CHLAMYDIA / GC-AMPLIFIED      RPR      HIV 1/2 AG/AB, 4TH GENERATION,W RFLX CONFIRM     Diagnoses and all orders for this visit:    1. Visit for [facility] man health check    2. Polysubstance dependence ([facility]    3. Bipolar affective disorder, current episode hypomanic ([facility]): Following with Psych, poorly controlled. On and off medications. COmplicated situation. Multiple stressors as well  -     CBC W/O DIFF; Future  -     METABOLIC PANEL, COMPREHENSIVE; Future    4. Skin rash:    5. Iron deficiency  -     IRON PROFILE; Future  -     FERRITIN; Future    6. Clubbing of nail: Labs now. If not showing cause need to expand work up.   Possible related to Hep C?  -     ZINC; Future  -     VITAMIN B3/NICOTINIC ACID; Future    7. Screening for STD (sexually transmitted disease)  -     HIV 1/2 AG/AB, 4TH GENERATION,W RFLX CONFIRM; Future  -     RPR; Future  -     José Arango / GC-AMPLIFIED; Future    Other orders  -     doxycycline (MONODOX) 100 mg capsule; Take 1 Capsule by mouth two (2) times a day for 7 days. -     hydrocortisone (CORTAID) 0.5 % topical cream; Apply  to affected area two (2) times a day. use thin layer      Follow-up and Dispositions    · Return in about 4 weeks (around 11/25/2021). I have discussed the diagnosis with the patient and the intended plan as seen in the above orders. Social history, medical history, and labs were reviewed. The patient has received an after-visit summary and questions were answered concerning future plans. I have discussed medication side effects and warnings with the patient as well.     MD LYNN Plaza & ALEXANDRIA PINEDA [facility]  10/28/21

## 2021-10-29 LAB
ALBUMIN SERPL-MCNC: 4.2 G/DL (ref 3.5–5)
ALBUMIN/GLOB SERPL: 1.2 {RATIO} (ref 1.1–2.2)
ALP SERPL-CCNC: 56 U/L (ref 45–117)
ALT SERPL-CCNC: 71 U/L (ref 12–78)
ANION GAP SERPL CALC-SCNC: 5 MMOL/L (ref 5–15)
AST SERPL-CCNC: 20 U/L (ref 15–37)
BILIRUB SERPL-MCNC: 0.3 MG/DL (ref 0.2–1)
BUN SERPL-MCNC: 13 MG/DL (ref 6–20)
BUN/CREAT SERPL: 13 (ref 12–20)
CALCIUM SERPL-MCNC: 9.4 MG/DL (ref 8.5–10.1)
CHLORIDE SERPL-SCNC: 108 MMOL/L (ref 97–108)
CO2 SERPL-SCNC: 27 MMOL/L (ref 21–32)
CREAT SERPL-MCNC: 1.04 MG/DL (ref 0.7–1.3)
ERYTHROCYTE [DISTWIDTH] IN BLOOD BY AUTOMATED COUNT: 12.9 % (ref 11.5–14.5)
FERRITIN SERPL-MCNC: 51 NG/ML (ref 26–388)
GLOBULIN SER CALC-MCNC: 3.5 G/DL (ref 2–4)
GLUCOSE SERPL-MCNC: 83 MG/DL (ref 65–100)
HCT VFR BLD AUTO: 43.1 % (ref 36.6–50.3)
HGB BLD-MCNC: 14.6 G/DL (ref 12.1–17)
HIV 1+2 AB+HIV1 P24 AG SERPL QL IA: NONREACTIVE
HIV12 RESULT COMMENT, HHIVC: NORMAL
IRON SATN MFR SERPL: 22 % (ref 20–50)
IRON SERPL-MCNC: 74 UG/DL (ref 35–150)
MCH RBC QN AUTO: 31.7 PG (ref 26–34)
MCHC RBC AUTO-ENTMCNC: 33.9 G/DL (ref 30–36.5)
MCV RBC AUTO: 93.7 FL (ref 80–99)
NRBC # BLD: 0 K/UL (ref 0–0.01)
NRBC BLD-RTO: 0 PER 100 WBC
PLATELET # BLD AUTO: 207 K/UL (ref 150–400)
PMV BLD AUTO: 12.2 FL (ref 8.9–12.9)
POTASSIUM SERPL-SCNC: 3.7 MMOL/L (ref 3.5–5.1)
PROT SERPL-MCNC: 7.7 G/DL (ref 6.4–8.2)
RBC # BLD AUTO: 4.6 M/UL (ref 4.1–5.7)
RPR SER QL: NONREACTIVE
SODIUM SERPL-SCNC: 140 MMOL/L (ref 136–145)
TIBC SERPL-MCNC: 334 UG/DL (ref 250–450)
WBC # BLD AUTO: 7.8 K/UL (ref 4.1–11.1)

## 2021-10-31 LAB — ZINC SERPL-MCNC: 84 UG/DL (ref 44–115)

## 2021-11-02 LAB
C TRACH RRNA SPEC QL NAA+PROBE: NEGATIVE
N GONORRHOEA RRNA SPEC QL NAA+PROBE: NEGATIVE
SPECIMEN SOURCE: NORMAL

## 2021-11-05 ENCOUNTER — TELEPHONE (OUTPATIENT)
Dept: FAMILY MEDICINE CLINIC | Age: 31
End: 2021-11-05

## 2021-11-05 NOTE — TELEPHONE ENCOUNTER
Spoke to patient, discussed labs, Awaiting Nicotinic Acid level.     MD LYNN Plaza & ALEX[place]RIA PINEDA [facility]  11/05/21

## 2021-11-09 LAB
NIACIN SERPL-MCNC: <5 NG/ML (ref 0–5)
NICOTINAMIDE SERPL-MCNC: 58.9 NG/ML (ref 5.2–72.1)

## 2021-12-03 ENCOUNTER — TELEPHONE (OUTPATIENT)
Dept: FAMILY MEDICINE CLINIC | Age: 31
End: 2021-12-03

## 2021-12-03 NOTE — TELEPHONE ENCOUNTER
----- Message from Olimpia Beal sent at 12/3/2021  2:37 PM EST -----  Subject: Refill Request    QUESTIONS  Name of Medication? clonazePAM (KlonoPIN) 0.5 mg tablet  Patient-reported dosage and instructions? says he take 2 1/2 tablets daily  How many days do you have left? 0  [facility]? [id number]  Pharmacy phone number (if available)? [phone]  ---------------------------------------------------------------------------  --------------  Ward MCKEON  What is the best way for the office to contact you? OK to leave message on   voicemail  Preferred Call Back Phone Number?  [phone]

## 2021-12-03 NOTE — TELEPHONE ENCOUNTER
Advised patient that this medication is prescribed and managed by psychiatrist and he should reach out to them ( [address] [place] [address]) for refills. He verbalized understanding.

## 2021-12-22 ENCOUNTER — OFFICE VISIT (OUTPATIENT)
Dept: FAMILY MEDICINE CLINIC | Age: 31
End: 2021-12-22
Payer: MEDICAID

## 2021-12-22 ENCOUNTER — TELEPHONE (OUTPATIENT)
Dept: FAMILY MEDICINE CLINIC | Age: 31
End: 2021-12-22

## 2021-12-22 VITALS
SYSTOLIC BLOOD PRESSURE: 124 MMHG | BODY MASS INDEX: 20.33 KG/M2 | WEIGHT: 145.2 LBS | HEIGHT: 71 IN | OXYGEN SATURATION: 98 % | DIASTOLIC BLOOD PRESSURE: 72 MMHG | HEART RATE: 105 BPM | TEMPERATURE: 97 F | RESPIRATION RATE: 16 BRPM

## 2021-12-22 DIAGNOSIS — F31.0 BIPOLAR AFFECTIVE DISORDER, CURRENT EPISODE HYPOMANIC (HCC): ICD-10-CM

## 2021-12-22 DIAGNOSIS — R21 RASH: Primary | ICD-10-CM

## 2021-12-22 PROCEDURE — 99214 OFFICE O/P EST MOD 30 MIN: CPT | Performed by: FAMILY MEDICINE

## 2021-12-22 RX ORDER — TRIAMCINOLONE ACETONIDE 1 MG/G
1 OINTMENT TOPICAL 2 TIMES DAILY
Qty: 453.6 G | Refills: 2 | Status: SHIPPED | OUTPATIENT
Start: 2021-12-22

## 2021-12-22 NOTE — TELEPHONE ENCOUNTER
Pt states that he only get 30 tabs of the clonazepam and is directed to take 1 2x daily. He's running out of pills early.

## 2021-12-22 NOTE — PROGRESS NOTES
1. Have you been to the ER, urgent care clinic since your last visit? Hospitalized since your last visit? No    2. Have you seen or consulted any other health care providers outside of the [address] [place] [address] since your last visit? Include any pap smears or colon screening. No    Reviewed record in preparation for visit and have necessary documentation  Goals that were addressed and/or need to be completed during or after this appointment include     Health Maintenance Due   Topic Date Due    COVID-19 Vaccine (1) Never done    Pneumococcal 0-64 years (1 of 2 - PPSV23) Never done    Flu Vaccine (1) Never done       Patient is accompanied by self I have received verbal consent from Osiris Worrell III to discuss any/all medical information while they are present in the room.

## 2021-12-22 NOTE — PROGRESS NOTES
[employer]    History of Present Illness:   Aliya Jones III is a 28 y.o. male with history of  Bipolar Affective Disorder, Polysubstance abuse. CC: Follow up, Bed bugs  History provided by patient and Records    HPI:  Bed Bugs:Patient reports having bed bugs and having bites all over with rash as well. Had bed bugs, then moved and feels has bed bugs again. Patient has had rash/itching/redness in multiple places and irritation. Psychosis:  Patient wants to change psychiatrist.  Stated tried Seroquel and started having SI. Patient stated was taking Olanzapine and Clonazepam.  Patient reports getting 30 x 0.5 mg tabs, but is using 3 tabs a day. Patient has had some stressors. Seeing Ekaterina Katz for [facility], all virtual visits, calls every 3 months. States he did not feel the Olanzapine was helping either. Health Maintenance  Health Maintenance Due   Topic Date Due    COVID-19 Vaccine (1) Never done    Pneumococcal 0-64 years (1 of 2 - PPSV23) Never done    Flu Vaccine (1) Never done       Past Medical, Family, and Social History:     Current Outpatient Medications on File Prior to Visit   Medication Sig Dispense Refill    clonazePAM (KlonoPIN) 1 mg tablet Take 1 mg by mouth daily.  clonazePAM (KlonoPIN) 0.5 mg tablet TAKE 1 TABLET BY MOUTH TWICE DAILY AS NEEDED FOR ANXIETY MAX  DAILY  AMOUNT  1MG. PATIENT  NEEDS  FOLLOW  UP  APPT WITH PSYCHIATRY 30 Tablet 0    [DISCONTINUED] FLUoxetine (PROzac) 20 mg capsule Take 20 mg by mouth daily.  [DISCONTINUED] OLANZapine (ZyPREXA) 20 mg tablet Take 20 mg by mouth daily.  [DISCONTINUED] hydrocortisone (CORTAID) 0.5 % topical cream Apply  to affected area two (2) times a day. use thin layer 30 g 0     No current facility-administered medications on file prior to visit.        Patient Active Problem List   Diagnosis Code    Psychosis NOS F29    Polysubstance dependence ([facility] F19.20    Anxiety state, unspecified F41.1    Esophageal reflux K21.9    Bipolar affective (HCC) F31.9    Depression F32. A    Panic attack F41.0    Suicidal ideation R45.851    Nephrolithiasis N20.0    Right aortic arch Q25.47       Social History     Socioeconomic History    Marital status: SINGLE     Spouse name: Not on file    Number of children: Not on file    Years of education: Not on file    Highest education level: Not on file   Occupational History    Not on file   Tobacco Use    Smoking status: Current Every Day Smoker     Packs/day: 1.00    Smokeless tobacco: Former User   Substance and Sexual Activity    Alcohol use: No    Drug use: Yes     Types: Marijuana, Heroin    Sexual activity: Yes     Partners: Female     Birth control/protection: None   Other Topics Concern    Not on file   Social History Narrative    Not on file     Social Determinants of Health     Financial Resource Strain:     Difficulty of Paying Living Expenses: Not on file   Food Insecurity:     Worried About Running Out of Food in the Last Year: Not on file    Anastasia of Food in the Last Year: Not on file   Transportation Needs:     Lack of Transportation (Medical): Not on file    Lack of Transportation (Non-Medical):  Not on file   Physical Activity:     Days of Exercise per Week: Not on file    Minutes of Exercise per Session: Not on file   Stress:     Feeling of Stress : Not on file   Social Connections:     Frequency of Communication with Friends and Family: Not on file    Frequency of Social Gatherings with Friends and Family: Not on file    Attends Jehovah's witness Services: Not on file    Active Member of Clubs or Organizations: Not on file    Attends Club or Organization Meetings: Not on file    Marital Status: Not on file   Intimate Partner Violence:     Fear of Current or Ex-Partner: Not on file    Emotionally Abused: Not on file    Physically Abused: Not on file    Sexually Abused: Not on file   Housing Stability:     Unable to Pay for Housing in the Last Year: Not on file    Number of Places Lived in the Last Year: Not on file    Unstable Housing in the Last Year: Not on file       Review of Systems   Review of Systems   Constitutional: Negative for chills and fever. HENT: Negative for congestion. Cardiovascular: Negative for chest pain and palpitations. Gastrointestinal: Negative for abdominal pain, nausea and vomiting. Neurological: Negative for dizziness and headaches. Objective:     Visit Vitals  /72 (BP 1 Location: Right arm, BP Patient Position: Sitting, BP Cuff Size: Adult)   Pulse (!) 105   Temp 97 °F (36.1 °C) (Oral)   Resp 16   Ht 5' 11\" (1.803 m)   Wt 145 lb 3.2 oz (65.9 kg)   SpO2 98%   BMI 20.25 kg/m²        Physical Exam  Vitals and nursing note reviewed. Constitutional:       Appearance: Normal appearance. HENT:      Head: Normocephalic and atraumatic. Cardiovascular:      Rate and Rhythm: Normal rate and regular rhythm. Pulses: Normal pulses. Heart sounds: Normal heart sounds. No murmur heard. No friction rub. No gallop. Pulmonary:      Effort: Pulmonary effort is normal.      Breath sounds: Normal breath sounds. Abdominal:      General: Abdomen is flat. Bowel sounds are normal.      Palpations: Abdomen is soft. Musculoskeletal:      Cervical back: Normal range of motion and neck supple. Skin:     General: Skin is warm and dry. Neurological:      General: No focal deficit present. Mental Status: He is alert and oriented to person, place, and time. Pertinent Labs/Studies:      Assessment and orders:       ICD-10-CM ICD-9-CM    1. Rash  R21 782.1 triamcinolone acetonide (KENALOG) 0.1 % ointment   2. Bipolar affective disorder, current episode hypomanic ([facility]  F31.0 296.40      Diagnoses and all orders for this visit:    1. Rash: Likely related to Bed bugs. -     triamcinolone acetonide (KENALOG) 0.1 % ointment;  Apply 1 g to affected area two (2) times a day. use thin layer on the arms    2. Bipolar affective disorder, current episode hypomanic [facility]): Need to speak with psychiatrist.  Discuss options. Advised needs to keep psychiatrist at this time. Discussed Clonazepam use. Follow-up and Dispositions    · Return in about 4 weeks (around 1/19/2022). I have discussed the diagnosis with the patient and the intended plan as seen in the above orders. Social history, medical history, and labs were reviewed. The patient has received an after-visit summary and questions were answered concerning future plans. I have discussed medication side effects and warnings with the patient as well.     MD LYNN Burns & ALEX[place]RIA PINEDA [facility]  12/22/21

## 2022-01-20 ENCOUNTER — TELEPHONE (OUTPATIENT)
Dept: FAMILY MEDICINE CLINIC | Age: 32
End: 2022-01-20

## 2022-01-20 NOTE — TELEPHONE ENCOUNTER
Call made to pt, no answer, mess left. Office opening late due to weather.  Please double book appt at 1:40 if pt can

## 2022-01-21 ENCOUNTER — TELEPHONE (OUTPATIENT)
Dept: FAMILY MEDICINE CLINIC | Age: 32
End: 2022-01-21

## 2022-01-21 NOTE — TELEPHONE ENCOUNTER
PT thought his appt for today was moved to 2:30 and not 1:40. Pt missed his appt, and would like to see if Dr HOWE can still fit him in today, please advise.

## 2022-01-21 NOTE — TELEPHONE ENCOUNTER
Patient will have to reschedule appt.     Everlyn Burkitt, MD PRISCILLA CHAN & A[place]RIA PINEDA [facility]  01/21/22

## 2022-02-03 ENCOUNTER — OFFICE VISIT (OUTPATIENT)
Dept: HEMATOLOGY | Age: 32
End: 2022-02-03
Payer: MEDICAID

## 2022-02-03 VITALS
TEMPERATURE: 97.1 F | DIASTOLIC BLOOD PRESSURE: 70 MMHG | HEART RATE: 83 BPM | OXYGEN SATURATION: 95 % | WEIGHT: 145 LBS | RESPIRATION RATE: 17 BRPM | HEIGHT: 71 IN | BODY MASS INDEX: 20.3 KG/M2 | SYSTOLIC BLOOD PRESSURE: 113 MMHG

## 2022-02-03 DIAGNOSIS — B18.2 CHRONIC HEPATITIS C WITHOUT HEPATIC COMA (HCC): Primary | ICD-10-CM

## 2022-02-03 PROBLEM — Q25.47 RIGHT AORTIC ARCH: Status: RESOLVED | Noted: 2021-03-26 | Resolved: 2022-02-03

## 2022-02-03 PROCEDURE — 99203 OFFICE O/P NEW LOW 30 MIN: CPT | Performed by: INTERNAL MEDICINE

## 2022-02-03 NOTE — Clinical Note
2/20/2022    Patient: Thony Gage III   YOB: 1990   Date of Visit: 2/3/2022     Hola Rivera MD  [address] [address]  Via In Basket    Dear Hola Rivera MD,      Thank you for referring Mr. Mary Lou Rice to [address] for evaluation. My notes for this consultation are attached. If you have questions, please do not hesitate to call me. I look forward to following your patient along with you.       Sincerely,    Aura Marquis MD

## 2022-02-03 NOTE — PROGRESS NOTES
[facility], MD, [address], [place], [place]      JOSEPH Fonseca, [facility]     Lulu Rodgers, [facility]   YARITZA BenderP-OLGA Ricardo, [facility]       Adis Bliss [facility] 136    at [facility]    217 [facility], [address] [place] [address], [facility] 22.    [phone]    FAX: [address] [place] [address]    at [address], [address]    [phone]    FAX: [phone]       Patient Care Team:  Ivanna Encarnacion MD as PCP - General (Family Medicine)  Ivanna Encarnacion MD as PCP - [facility] Provider      Problem List  Date Reviewed: 2/20/2022          Codes Class Noted    Hepatitis C antibody positive in blood ICD-10-CM: R76.8  ICD-9-CM: 795.79  2/20/2022        Nephrolithiasis ICD-10-CM: N20.0  ICD-9-CM: 592.0  3/23/2021        Suicidal ideation ICD-10-CM: R45.851  ICD-9-CM: V62.84  1/12/2021        Panic attack ICD-10-CM: F41.0  ICD-9-CM: 300.01  7/22/2014        Bipolar affective ([address] ICD-10-CM: F31.9  ICD-9-CM: 296.80  11/27/2013        Anxiety state, unspecified ICD-10-CM: F41.1  ICD-9-CM: 300.00  5/16/2013        Esophageal reflux ICD-10-CM: K21.9  ICD-9-CM: 530.81  5/16/2013        Psychosis NOS ICD-10-CM: F29  ICD-9-CM: 298.9  5/20/2011        Drug abuse in remission [facility]) ICD-10-CM: F19.11  ICD-9-CM: 305.93  5/20/2011                The clinicians listed above have asked me to see Clyde Sullivan III in consultation regarding chronic HCV and its management. All medical records sent by the referring physicians were reviewed including imaging studies     The patient is a 28 y.o.  male who was screened for anti-HCV and tested positive in 3/2021. Risk factors for acquiring HCV are IV drug use inhaling cocaine in 2010 - 2016. There was no history of acute icteric hepatitis at the time of these risk factors. CT scan of the liver was performed in 2017. The results of the imaging demonstrated a normal appearing liver. An assessment of liver fibrosis with biopsy or elastography has not been performed. The patient has never received treatment for chronic HCV. The patient has the following symptoms which could be attributed to the liver disorder:    fatigue,     The patient is not experiencing the following symptoms which are commonly seen in this liver disorder:   pain in the right side over the liver,     The patient completes all daily activities without any functional limitations. ASSESSMENT AND PLAN:  ANTI-HCV positive   The patient has been exposed to HCV but has persistently normal liver enzymes. He may have had spontaneous resolution of HCV  Will obtain HCV RNA     ADDENDUM:  He left the office without getting laboratory studies. We will contact him to have these performed. If HCV RNA is undetectable than the patient has been exposed to HCV but has had spontaneous resolution. If HCV RNA is positive additional evaluation for HCV will be needed prior to treatment. Liver transaminases are normal.  ALP is normal.  Liver function is normal.  The platelet count is normal.      Based upon laboratory studies and imaging  the patient does not appear to have appears to have significant liver injury. Have performed laboratory testing to monitor liver function and degree of liver injury. This included BMP, hepatic panel, CBC with platelet count, INR. Will perform and/or review results of HCV viral load, HCV genotype to define the specific treatment and duration of treatment that will be required. Will perform  serologic and virologic studies to assess for other causes of chronic liver disease. Will perform imaging of the liver with ultrasound.       If HCV RNA is positive he will need an assessment of liver fibrosis. This was discussed with the patient. The Fibroscan can assess liver fibrosis and determine if a patient has advanced fibrosis or cirrhosis without the need for liver biopsy. This will be performed at the next office visit. Screening for Hepatocellular Carcinoma  HCC screening is not necessary if the patient has no evidence of cirrhosis. Treatment of other medical problems in patients with chronic liver disease  There are no contraindications for the patient to take most medications that are necessary for treatment of other medical issues. Counseling for alcohol in patients with chronic liver disease  The patient was counseled regarding alcohol consumption and the effect of alcohol on chronic liver disease. The patient does not consume any significant amount of alcohol. Substance Use  The patient was counseled regarding the risk of overdose and death from using opioids and other narcotic drugs. Discussed the risk of becoming reinfected with HCV once they are cured if they resume IV drug use or inhaling drugs nasally. The patient has not used drugs since 2016. Vaccinations   The need for vaccination against viral hepatitis A and B will be assessed with serologic and instituted as appropriate. Routine vaccinations against other bacterial and viral agents can be performed as indicated. Annual flu vaccination should be administered if indicated. ALLERGIES  Allergies   Allergen Reactions    Sulfa (Sulfonamide Antibiotics) Hives and Unknown (comments)     States has always been told allergic but does not recall any specific reaction       MEDICATIONS  Current Outpatient Medications   Medication Sig    triamcinolone acetonide (KENALOG) 0.1 % ointment Apply 1 g to affected area two (2) times a day. use thin layer on the arms    clonazePAM (KlonoPIN) 0.5 mg tablet TAKE 1 TABLET BY MOUTH TWICE DAILY AS NEEDED FOR ANXIETY MAX  DAILY  AMOUNT  1MG.   PATIENT NEEDS  FOLLOW  UP  APPT WITH PSYCHIATRY (Patient taking differently: 0.5 mg. TAKE 1 TABLET BY MOUTH TWICE DAILY AS NEEDED FOR ANXIETY MAX  DAILY  AMOUNT  1MG. PATIENT  NEEDS  FOLLOW  UP  APPT WITH PSYCHIATRY  Indications: scott associated with bipolar disorder, adjunct treatment)    clonazePAM (KlonoPIN) 1 mg tablet Take 1 mg by mouth daily. (Patient not taking: Reported on 2/3/2022)     No current facility-administered medications for this visit. SYSTEM REVIEW NOT RELATED TO LIVER DISEASE OR REVIEWED ABOVE:  Constitution systems: Negative for fever, chills, weight gain, weight loss. Eyes: Negative for visual changes. ENT: Negative for sore throat, painful swallowing. Respiratory: Negative for cough, hemoptysis, SOB. Cardiology: Negative for chest pain, palpitations. GI:  Negative for constipation or diarrhea. : Negative for urinary frequency, dysuria, hematuria, nocturia. Skin: Negative for rash. Hematology: Negative for easy bruising, blood clots. Musculo-skelatal: Negative for back pain, muscle pain, weakness. Neurologic: Negative for headaches, dizziness, vertigo, memory problems not related to HE. Psychology: Negative for anxiety, depression. FAMILY HISTORY:  The father Has/had the following following chronic disease(s): MI. The mother  of ESRD. There is no family history of liver disease. SOCIAL HISTORY:  The patient has never been . The patient has no children. The patient currently smokes 1 pack of tobacco daily. The patient has never consumed significant amounts of alcohol. The patient currently works full time [employer] PHYSICAL EXAMINATION:  Visit Vitals  /70 (BP 1 Location: Left upper arm, BP Patient Position: Sitting, BP Cuff Size: Adult)   Pulse 83   Temp 97.1 °F (36.2 °C) (Temporal)   Resp 17   Ht 5' 11\" (1.803 m)   Wt 145 lb (65.8 kg)   SpO2 95%   BMI 20.22 kg/m²     General: No acute distress.    Eyes: Sclera anicteric. ENT: No oral lesions. Thyroid normal.  Nodes: No adenopathy. Skin: No spider angiomata. No jaundice. No palmar erythema. Respiratory: Lungs clear to auscultation. Cardiovascular: Regular heart rate. No murmurs. No JVD. Abdomen: Soft non-tender. Liver size normal to percussion/palpation. Spleen not palpable. No obvious ascites. Extremities: No edema. No muscle wasting. No gross arthritic changes. Neurologic: Alert and oriented. Cranial nerves grossly intact. No asterixis. LABORATORY STUDIES:  Liver [place] of [address] Units 10/28/2021 3/23/2021   WBC 4.1 - 11.1 K/uL 7.8 10.8   ANC 1.8 - 8.0 K/UL  6.7   HGB 12.1 - 17.0 g/dL 14.6 14.5    - 400 K/uL 207 211   AST 15 - 37 U/L 20 10 (L)   ALT 12 - 78 U/L 71 20   Alk Phos 45 - 117 U/L 56 69   Bili, Total 0.2 - 1.0 MG/DL 0.3 0.3   Albumin 3.5 - 5.0 g/dL 4.2 4.0   BUN 6 - 20 MG/DL 13 11   Creat 0.70 - 1.30 MG/DL 1.04 0.99   Na 136 - 145 mmol/L 140 140   K 3.5 - 5.1 mmol/L 3.7 4.3   Cl 97 - 108 mmol/L 108 105   CO2 21 - 32 mmol/L 27 30   Glucose 65 - 100 mg/dL 83 82     SEROLOGIES:  Serologies Latest Ref Rng & Units 10/28/2021 3/23/2021   Hep B Surface Ag Index  <0.10   Hep B Surface Ag Interp Negative    Negative   Hep C Ab NONREACTIVE    REACTIVE (A)   Ferritin 26 - 388 NG/ML 51    Iron % Saturation 20 - 50 % 22      LIVER HISTOLOGY:  Not available or performed    ENDOSCOPIC PROCEDURES:  Not available or performed    RADIOLOGY:  Not available or performed    OTHER TESTING:  Not available or performed    FOLLOW-UP:  All of the issues listed above in the Assessment and Plan were discussed with the patient. All questions were answered. The patient expressed a clear understanding of the above. [facility] [place] [address] in 4 weeks for Fibroscan to review all data and determine the treatment plan.       Jeaneth Perez MD  [facility] [address] [place], [address]  201 [facility]

## 2022-02-20 PROBLEM — R76.8 HEPATITIS C ANTIBODY POSITIVE IN BLOOD: Status: ACTIVE | Noted: 2022-02-20

## 2022-02-21 ENCOUNTER — OFFICE VISIT (OUTPATIENT)
Dept: FAMILY MEDICINE CLINIC | Age: 32
End: 2022-02-21
Payer: MEDICAID

## 2022-02-21 ENCOUNTER — TELEPHONE (OUTPATIENT)
Dept: HEMATOLOGY | Age: 32
End: 2022-02-21

## 2022-02-21 VITALS
SYSTOLIC BLOOD PRESSURE: 123 MMHG | OXYGEN SATURATION: 100 % | HEART RATE: 110 BPM | RESPIRATION RATE: 20 BRPM | HEIGHT: 71 IN | BODY MASS INDEX: 20.02 KG/M2 | TEMPERATURE: 97.3 F | WEIGHT: 143 LBS | DIASTOLIC BLOOD PRESSURE: 79 MMHG

## 2022-02-21 DIAGNOSIS — K21.9 GASTROESOPHAGEAL REFLUX DISEASE WITHOUT ESOPHAGITIS: ICD-10-CM

## 2022-02-21 DIAGNOSIS — R76.8 HEPATITIS C ANTIBODY POSITIVE IN BLOOD: ICD-10-CM

## 2022-02-21 DIAGNOSIS — R21 SKIN RASH: Primary | ICD-10-CM

## 2022-02-21 DIAGNOSIS — F31.0 BIPOLAR AFFECTIVE DISORDER, CURRENT EPISODE HYPOMANIC (HCC): ICD-10-CM

## 2022-02-21 PROCEDURE — 99214 OFFICE O/P EST MOD 30 MIN: CPT | Performed by: FAMILY MEDICINE

## 2022-02-21 RX ORDER — PERMETHRIN 50 MG/G
CREAM TOPICAL
Qty: 120 G | Refills: 1 | Status: SHIPPED | OUTPATIENT
Start: 2022-02-21

## 2022-02-21 NOTE — TELEPHONE ENCOUNTER
----- Message from Grant Brooke MD sent at 2/19/2022  9:59 AM EST -----  Regarding: He did not get any labs after Appt. Have him get labs. and let me klnow when results are in.      [email or web address] Attempt to call patient, phone at this time only gets a busy signal. Attempt to call Ale-- --her voice mail not set up. Will try again later. (CATHY)---[phone] Phone rings busy. [email or web address] Attempt to call patient again today. No answer, left another voice message. I have called contact numbers no answer. (CATHY)

## 2022-02-21 NOTE — PROGRESS NOTES
1. Have you been to the ER, urgent care clinic since your last visit? Hospitalized since your last visit? No    2. Have you seen or consulted any other health care providers outside of the [address] [place] [address] since your last visit? Include any pap smears or colon screening.  No  Reviewed record in preparation for visit and have necessary documentation  Pt did not bring medication to office visit for review    Goals that were addressed and/or need to be completed during or after this appointment include   Health Maintenance Due   Topic Date Due    COVID-19 Vaccine (1) Never done    Pneumococcal 0-64 years (1 of 2 - PPSV23) Never done    Flu Vaccine (1) Never done

## 2022-02-21 NOTE — PATIENT INSTRUCTIONS
Apply to skin then wash off in shower 6-58 hours after application. Can reapply after 1 week. [address]  [address] 30  [phone]

## 2022-02-21 NOTE — PROGRESS NOTES
[facility]    History of Present Illness:   Anh Patricia III is a 28 y.o. male with history of Bipolar Affective Disorder, Polysubstance abuse. CC: Rash  History provided by patient and Records    HPI:  Rash: Patient with persistent issue with rashes, itching. Patient is concerned he has scabies at this time as well given he has rash and his father has similar symptoms. Denies seeing actual bed bugs. Denies any kind chemical use on the hands or body regularly. Patient noting scratches, itching, and lesions on the arms and legs primarily. Bipolar Affective: Seeing specialist, appears recently increase Clonazepam, but patient is aware that he is obsessing over multiple things as well. Does spend a great deal of time of focusing on skin and rash. Patient reports his issues occur when he runs out. Sounds like he has been using mor than planned. Hep C: Patient has Fibroscan upcoming. Health Maintenance  Health Maintenance Due   Topic Date Due    COVID-19 Vaccine (1) Never done    Pneumococcal 0-64 years (1 of 2 - PPSV23) Never done       Past Medical, Family, and Social History:     Current Outpatient Medications on File Prior to Visit   Medication Sig Dispense Refill    triamcinolone acetonide (KENALOG) 0.1 % ointment Apply 1 g to affected area two (2) times a day. use thin layer on the arms 453.6 g 2    clonazePAM (KlonoPIN) 0.5 mg tablet TAKE 1 TABLET BY MOUTH TWICE DAILY AS NEEDED FOR ANXIETY MAX  DAILY  AMOUNT  1MG. PATIENT  NEEDS  FOLLOW  UP  APPT WITH PSYCHIATRY (Patient taking differently: 0.5 mg. TAKE 1 TABLET BY MOUTH TWICE DAILY AS NEEDED FOR ANXIETY MAX  DAILY  AMOUNT  1MG. PATIENT  NEEDS  FOLLOW  UP  APPT WITH PSYCHIATRY  Indications: scott associated with bipolar disorder, adjunct treatment) 30 Tablet 0     No current facility-administered medications on file prior to visit.        Patient Active Problem List   Diagnosis Code    Psychosis NOS F29    Drug abuse in remission ([facility]) F19.11    Anxiety state, unspecified F41.1    Esophageal reflux K21.9    Bipolar affective ([facility]) F31.9    Panic attack F41.0    Suicidal ideation R45.851    Nephrolithiasis N20.0    Hepatitis C antibody positive in blood R76.8       Social History     Socioeconomic History    Marital status: SINGLE   Tobacco Use    Smoking status: Current Every Day Smoker     Packs/day: 1.00    Smokeless tobacco: Former User   Substance and Sexual Activity    Alcohol use: No    Drug use: Yes     Types: Marijuana, Heroin    Sexual activity: Yes     Partners: Female     Birth control/protection: None        Review of Systems   Review of Systems   Constitutional: Negative for chills and fever. HENT: Negative for congestion. Cardiovascular: Negative for chest pain and palpitations. Gastrointestinal: Negative for nausea and vomiting. Neurological: Negative for dizziness and headaches. Psychiatric/Behavioral: Positive for depression. The patient is nervous/anxious. Objective:     Visit Vitals  /79   Pulse (!) 110   Temp 97.3 °F (36.3 °C)   Resp 20   Ht 5' 11\" (1.803 m)   Wt 143 lb (64.9 kg)   SpO2 100%   BMI 19.94 kg/m²        Physical Exam  Vitals and nursing note reviewed. Constitutional:       Appearance: Normal appearance. HENT:      Head: Normocephalic and atraumatic. Cardiovascular:      Rate and Rhythm: Normal rate and regular rhythm. Pulses: Normal pulses. Heart sounds: Normal heart sounds. No murmur heard. No friction rub. No gallop. Pulmonary:      Effort: Pulmonary effort is normal.      Breath sounds: Normal breath sounds. Abdominal:      General: Abdomen is flat. Bowel sounds are normal.      Palpations: Abdomen is soft. Musculoskeletal:      Cervical back: Normal range of motion and neck supple. Comments: Multiple excoriations over the arms and legs bilaterally. Skin:     General: Skin is warm and dry.    Neurological:      Mental Status: He is alert. Pertinent Labs/Studies:      Assessment and orders:       ICD-10-CM ICD-9-CM    1. Skin rash  R21 782. 1 permethrin (ACTICIN) 5 % topical cream   2. Bipolar affective disorder, current episode hypomanic ([facility] [address]  F31.0 296.40    3. Hepatitis C antibody positive in blood  R76.8 795.79    4. Gastroesophageal reflux disease without esophagitis  K21.9 530.81      Diagnoses and all orders for this visit:    1. Skin rash: Will trial Permetherin, but seems more psychosis. 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 -     permethrin (ACTICIN) 5 % topical cream; apply sparingly as directed    2. Bipolar affective disorder, current episode hypomanic [facility]): Patient still needs to see Specialist, did recently increase Clonazepam to BID dosing. 3. Hepatitis C antibody positive in blood: Seeing specialist    4. Gastroesophageal reflux disease without esophagitis: Appears stable. Follow-up and Dispositions    · Return in about 2 weeks (around 3/7/2022) for Skin Rash. I have discussed the diagnosis with the patient and the intended plan as seen in the above orders. Social history, medical history, and labs were reviewed. The patient has received an after-visit summary and questions were answered concerning future plans. I have discussed medication side effects and warnings with the patient as well.     MD LYNN Ochoa & ALEXANDRIA PINEDA [facility]  02/21/22

## 2022-02-22 ENCOUNTER — TELEPHONE (OUTPATIENT)
Dept: HEMATOLOGY | Age: 32
End: 2022-02-22

## 2022-02-22 NOTE — TELEPHONE ENCOUNTER
----- Message from Omar Yu MD sent at 2/19/2022  9:59 AM EST -----  Regarding: He did not get any labs after Appt. Have him get labs. and let me bradw when results are in.

## 2022-03-19 PROBLEM — R76.8 HEPATITIS C ANTIBODY POSITIVE IN BLOOD: Status: ACTIVE | Noted: 2022-02-20

## 2022-03-20 PROBLEM — R45.851 SUICIDAL IDEATION: Status: ACTIVE | Noted: 2021-01-12

## 2022-03-20 PROBLEM — N20.0 NEPHROLITHIASIS: Status: ACTIVE | Noted: 2021-03-23

## 2022-04-25 ENCOUNTER — TELEPHONE (OUTPATIENT)
Dept: FAMILY MEDICINE CLINIC | Age: 32
End: 2022-04-25

## 2022-05-24 ENCOUNTER — OFFICE VISIT (OUTPATIENT)
Dept: FAMILY MEDICINE CLINIC | Age: 32
End: 2022-05-24
Payer: MEDICAID

## 2022-05-24 VITALS
RESPIRATION RATE: 16 BRPM | DIASTOLIC BLOOD PRESSURE: 71 MMHG | BODY MASS INDEX: 20.72 KG/M2 | HEART RATE: 107 BPM | OXYGEN SATURATION: 100 % | TEMPERATURE: 98 F | HEIGHT: 71 IN | WEIGHT: 148 LBS | SYSTOLIC BLOOD PRESSURE: 128 MMHG

## 2022-05-24 DIAGNOSIS — F31.0 BIPOLAR AFFECTIVE DISORDER, CURRENT EPISODE HYPOMANIC (HCC): ICD-10-CM

## 2022-05-24 DIAGNOSIS — Z00.00 VISIT FOR WELL MAN HEALTH CHECK: Primary | ICD-10-CM

## 2022-05-24 DIAGNOSIS — F19.11 DRUG ABUSE IN REMISSION (HCC): ICD-10-CM

## 2022-05-24 PROCEDURE — 99395 PREV VISIT EST AGE 18-39: CPT | Performed by: STUDENT IN AN ORGANIZED HEALTH CARE EDUCATION/TRAINING PROGRAM

## 2022-05-24 PROCEDURE — 99213 OFFICE O/P EST LOW 20 MIN: CPT | Performed by: STUDENT IN AN ORGANIZED HEALTH CARE EDUCATION/TRAINING PROGRAM

## 2022-05-24 NOTE — PROGRESS NOTES
1. Have you been to the ER, urgent care clinic since your last visit? Hospitalized since your last visit?  no    2. Have you seen or consulted any other health care providers outside of the [address] [place] [address] since your last visit?  no      3. For patients aged 39-70: Has the patient had a colonoscopy / FIT/ Cologuard? If the patient is female:    4. For patients aged 41-77: Has the patient had a mammogram within the past 2 years? 5. For patients aged 21-65: Has the patient had a pap smear?        Opportunity was given for questions    Goals that were addressed and/or need to be completed during or after this appointment include   Health Maintenance Due   Topic Date Due    COVID-19 Vaccine (1) Never done    Pneumococcal 0-64 years (1 - PCV) Never done

## 2022-05-24 NOTE — PROGRESS NOTES
Jessica Badillo III is an 28 y.o. male who presents for well male exam.     Feeling anxious. Wants clonopin refill. Sexually active?: yes. monogamous    Method of protection: condoms    Diet: regular [place]    Exercise: no    Current smoker. No drugs or alcohol    Was seeing psych but was fired d/t missing visits. On chlonopin for anxiety and bipolar. No suicidal or homicidal ideation or intent. Hepatology: Hep C. Testing ordered but not collected at recent visit with meliza      Allergies- reviewed: Allergies   Allergen Reactions    Sulfa (Sulfonamide Antibiotics) Hives and Unknown (comments)     States has always been told allergic but does not recall any specific reaction         Medications- reviewed:   Current Outpatient Medications   Medication Sig    permethrin (ACTICIN) 5 % topical cream apply sparingly as directed (Patient not taking: Reported on 5/24/2022)    triamcinolone acetonide (KENALOG) 0.1 % ointment Apply 1 g to affected area two (2) times a day. use thin layer on the arms (Patient not taking: Reported on 5/24/2022)    clonazePAM (KlonoPIN) 0.5 mg tablet TAKE 1 TABLET BY MOUTH TWICE DAILY AS NEEDED FOR ANXIETY MAX  DAILY  AMOUNT  1MG. PATIENT  NEEDS  FOLLOW  UP  APPT WITH PSYCHIATRY (Patient not taking: Reported on 5/24/2022)     No current facility-administered medications for this visit.          Past Medical History- reviewed:  Past Medical History:   Diagnosis Date    Acid reflux     Allergic rhinitis     Anxiety     Bipolar     Depression     Drug overdose     Heroin    Nephrolithiasis 3/23/2021    Psychotic disorder ([facility] [address]          Past Surgical History- reviewed:   Past Surgical History:   Procedure Laterality Date    HX ORTHOPAEDIC      Right elbow    HX ORTHOPAEDIC      L hand         Family History - reviewed:  Family History   Problem Relation Age of Onset    Heart Disease Mother     Diabetes Maternal Uncle          Social History - reviewed:  Social History Socioeconomic History    Marital status: SINGLE     Spouse name: Not on file    Number of children: Not on file    Years of education: Not on file    Highest education level: Not on file   Occupational History    Not on file   Tobacco Use    Smoking status: Current Every Day Smoker     Packs/day: 1.00    Smokeless tobacco: Former User   Substance and Sexual Activity    Alcohol use: No    Drug use: Not Currently     Types: Marijuana, Heroin    Sexual activity: Yes     Partners: Female     Birth control/protection: None   Other Topics Concern    Not on file   Social History Narrative    Not on file     Social Determinants of Health     Financial Resource Strain:     Difficulty of Paying Living Expenses: Not on file   Food Insecurity:     Worried About Running Out of Food in the Last Year: Not on file    Anastasia of Food in the Last Year: Not on file   Transportation Needs:     Lack of Transportation (Medical): Not on file    Lack of Transportation (Non-Medical):  Not on file   Physical Activity:     Days of Exercise per Week: Not on file    Minutes of Exercise per Session: Not on file   Stress:     Feeling of Stress : Not on file   Social Connections:     Frequency of Communication with Friends and Family: Not on file    Frequency of Social Gatherings with Friends and Family: Not on file    Attends Yarsani Services: Not on file    Active Member of [address] [place] [address] [employer] or Organizations: Not on file    Attends Club or Organization Meetings: Not on file    Marital Status: Not on file   Intimate Partner Violence:     Fear of Current or Ex-Partner: Not on file    Emotionally Abused: Not on file    Physically Abused: Not on file    Sexually Abused: Not on file   Housing Stability:     Unable to Pay for Housing in the Last Year: Not on file    Number of [place] in the Last Year: Not on file    Unstable Housing in the Last Year: Not on file         Immunizations - reviewed:   Immunization History Administered Date(s) Administered    TD Vaccine 05/20/2011    Tdap 07/19/2015     Flu: yes  Tdap: yes  Pneumovax: declined  Zostervax: not indicated      Health Maintenance reviewed -   Colonoscopy not indicated  DEXA scan not indicated  PSA testing not indicated  HIV testing today  Hepatitis C testing positive. Will get tests per hepatology today  AAA screening not indicated  Lung cancer screening not indicated     No fever, chills, night sweats. No hearing or vision changes. No severe headaches. No confusion. No numbness, weakness, tingling. No nausea, vomiting, abdominal pain, diarrhea, constipation. No CP, SOB, PICKENS, LE edema. No dysuria, frequency, flank pain. Endorses anxiety        Objective:     Visit Vitals  /71 (BP 1 Location: Right arm, BP Patient Position: Sitting)   Pulse (!) 107   Temp 98 °F (36.7 °C) (Oral)   Resp 16   Ht 5' 11\" (1.803 m)   Wt 148 lb (67.1 kg)   SpO2 100%   BMI 20.64 kg/m²       General appearance - alert, well appearing. Anxious appearing  Eyes - pupils equal, extraocular eye movements intact  Neck - supple, no significant adenopathy  Chest - clear to auscultation, no wheezes, rales or rhonchi, symmetric air entry  Heart - normal rate, regular rhythm, normal S1, S2, no murmurs, rubs, clicks or gallops  Neurological - alert, oriented, normal speech, no focal findings or movement disorder noted  Extremities - peripheral pulses normal, no pedal edema, no clubbing or cyanosis   - deferred  Rectal - deferred      Assessment:       ICD-10-CM ICD-9-CM    1. Visit for well man health check  Z00.00 V70.0 HIV 1/2 AG/AB, 4TH GENERATION,W RFLX CONFIRM   2. Bipolar affective disorder, current episode hypomanic ([facility] [address]  F31.0 296.40 REFERRAL TO PSYCHOLOGY   3. Drug abuse in remission ([facility] [address]  F19.11 305.93 HIV 1/2 AG/AB, 4TH GENERATION,W RFLX CONFIRM         Plan:     Hep C  - Collect labs per aleksandra.  Add on HIV  -  Follow up with Aleksandra    Bipolar: No SI/HI  - Referral to [facility] for psych management    · Counseled re: diet, exercise, healthy lifestyle    · Appropriate labs, vaccines, imaging studies, and referrals ordered as listed above    · The patient was counseled on the dangers of tobacco use, and was advised to quit. Reviewed strategies to maximize success, including written materials. Follow-up and Dispositions    · Return in about 4 weeks (around 6/21/2022) for follow up on chronic conditions with Dr Pieter Joe. I have discussed the diagnosis with the patient and the intended plan as seen in the above orders. Patient verbalized understanding of the plan and agrees with the plan. The patient has received an after-visit summary and questions were answered concerning future plans. I have discussed medication side effects and warnings with the patient as well. Informed patient to return to the office if new symptoms arise.         Tara Ragland MD  Family Medicine Resident

## 2022-08-08 ENCOUNTER — TELEPHONE (OUTPATIENT)
Dept: FAMILY MEDICINE CLINIC | Age: 32
End: 2022-08-08

## 2022-08-08 NOTE — LETTER
8/8/2022 4:50 PM    Mr. Elvis Gonzalez  [address] [id number]     Your good health is important to us, that is why we are sending you this friendly reminder. As always, our goal is to be your partner in life-long wellness. Our records indicate that you have not made an appointment for your labs. The tests were ordered by Dr. Delmer Parker.      Please call [facility] at ([phone] to schedule this fasting lab appointment at your earliest convenience                Sincerely,      Liz Kelley MD

## 2022-08-08 NOTE — TELEPHONE ENCOUNTER
Patient contact Lizbeth Barajas phoned, answered and notified wrong number. No alternate numbers available for patient. Letter mailed regarding overdue ordered labs.

## 2022-09-15 ENCOUNTER — TELEPHONE (OUTPATIENT)
Dept: FAMILY MEDICINE CLINIC | Age: 32
End: 2022-09-15